# Patient Record
Sex: MALE
[De-identification: names, ages, dates, MRNs, and addresses within clinical notes are randomized per-mention and may not be internally consistent; named-entity substitution may affect disease eponyms.]

---

## 2018-05-10 ENCOUNTER — RX ONLY (RX ONLY)
Age: 58
End: 2018-05-10

## 2018-05-10 ENCOUNTER — DOCTOR'S OFFICE (OUTPATIENT)
Dept: URBAN - METROPOLITAN AREA CLINIC 126 | Facility: CLINIC | Age: 58
Setting detail: OPHTHALMOLOGY
End: 2018-05-10
Payer: COMMERCIAL

## 2018-05-10 DIAGNOSIS — H40.1132: ICD-10-CM

## 2018-05-10 DIAGNOSIS — H01.004: ICD-10-CM

## 2018-05-10 DIAGNOSIS — H01.001: ICD-10-CM

## 2018-05-10 DIAGNOSIS — E11.9: ICD-10-CM

## 2018-05-10 DIAGNOSIS — H25.13: ICD-10-CM

## 2018-05-10 DIAGNOSIS — H04.123: ICD-10-CM

## 2018-05-10 PROCEDURE — 92083 EXTENDED VISUAL FIELD XM: CPT | Performed by: OPHTHALMOLOGY

## 2018-05-10 PROCEDURE — 65855 TRABECULOPLASTY LASER SURG: CPT | Performed by: OPHTHALMOLOGY

## 2018-05-10 PROCEDURE — 92133 CPTRZD OPH DX IMG PST SGM ON: CPT | Performed by: OPHTHALMOLOGY

## 2018-05-10 PROCEDURE — 92004 COMPRE OPH EXAM NEW PT 1/>: CPT | Performed by: OPHTHALMOLOGY

## 2018-05-10 ASSESSMENT — REFRACTION_AUTOREFRACTION
OD_AXIS: 006
OS_AXIS: 009
OS_CYLINDER: +1.00
OD_CYLINDER: +0.75
OD_SPHERE: PLANO
OS_SPHERE: PLANO

## 2018-05-10 ASSESSMENT — REFRACTION_OUTSIDERX
OD_VA2: 20/
OD_VA1: 20/
OD_VA3: 20/
OS_SPHERE: +2.50
OS_VA1: 20/
OD_SPHERE: +2.50
OU_VA: 20/
OS_VA2: 20/
OS_VA3: 20/

## 2018-05-10 ASSESSMENT — REFRACTION_MANIFEST
OS_VA3: 20/
OD_VA2: 20/
OS_ADD: +2.50
OU_VA: 20/
OS_VA2: 20/
OD_VA2: 20/
OS_AXIS: 180
OU_VA: 20/
OD_VA1: 20/
OD_VA3: 20/
OS_VA1: 20/
OS_CYLINDER: +0.50
OD_SPHERE: -0.25
OD_ADD: +2.50
OS_VA2: 20/
OS_SPHERE: -0.25
OD_VA3: 20/
OS_VA3: 20/
OS_VA1: 20/25
OD_VA1: 20/25

## 2018-05-10 ASSESSMENT — REFRACTION_CURRENTRX
OD_OVR_VA: 20/
OS_OVR_VA: 20/
OS_OVR_VA: 20/
OD_OVR_VA: 20/
OD_OVR_VA: 20/
OS_OVR_VA: 20/

## 2018-05-10 ASSESSMENT — TEAR BREAK UP TIME (TBUT)
OS_TBUT: 1+
OD_TBUT: 1+

## 2018-05-10 ASSESSMENT — LID EXAM ASSESSMENTS
OD_BLEPHARITIS: 1+
OS_BLEPHARITIS: 1+

## 2018-05-10 ASSESSMENT — VISUAL ACUITY
OS_BCVA: 20/30+2
OD_BCVA: 20/30-1

## 2018-05-10 ASSESSMENT — KERATOMETRY
OS_K1POWER_DIOPTERS: 42.25
OS_AXISANGLE_DEGREES: 54
OD_K2POWER_DIOPTERS: 42.50
OD_K1POWER_DIOPTERS: 41.75
OD_AXISANGLE_DEGREES: 69
OS_K2POWER_DIOPTERS: 43.00

## 2018-05-10 ASSESSMENT — SPHEQUIV_DERIVED: OS_SPHEQUIV: 0

## 2018-05-10 ASSESSMENT — CONFRONTATIONAL VISUAL FIELD TEST (CVF)
OD_FINDINGS: FULL
OS_FINDINGS: FULL

## 2018-05-10 ASSESSMENT — AXIALLENGTH_DERIVED: OS_AL: 23.9177

## 2018-05-10 ASSESSMENT — SUPERFICIAL PUNCTATE KERATITIS (SPK)
OS_SPK: 1+
OD_SPK: 1+

## 2018-05-25 ENCOUNTER — OFFICE VISIT (OUTPATIENT)
Dept: INTERNAL MEDICINE | Facility: HOSPITAL | Age: 58
End: 2018-05-25
Attending: STUDENT IN AN ORGANIZED HEALTH CARE EDUCATION/TRAINING PROGRAM
Payer: COMMERCIAL

## 2018-05-25 VITALS
WEIGHT: 235 LBS | TEMPERATURE: 96.4 F | HEIGHT: 74 IN | OXYGEN SATURATION: 99 % | HEART RATE: 76 BPM | RESPIRATION RATE: 18 BRPM | BODY MASS INDEX: 30.16 KG/M2 | SYSTOLIC BLOOD PRESSURE: 145 MMHG | DIASTOLIC BLOOD PRESSURE: 74 MMHG

## 2018-05-25 DIAGNOSIS — F17.200 SMOKING: ICD-10-CM

## 2018-05-25 DIAGNOSIS — E11.40 TYPE 2 DIABETES MELLITUS WITH DIABETIC NEUROPATHY, WITHOUT LONG-TERM CURRENT USE OF INSULIN (CMS/HCC): ICD-10-CM

## 2018-05-25 DIAGNOSIS — E66.9 OBESITY (BMI 30-39.9): ICD-10-CM

## 2018-05-25 DIAGNOSIS — G89.29 OTHER CHRONIC PAIN: Primary | ICD-10-CM

## 2018-05-25 PROBLEM — E11.49 TYPE 2 DIABETES MELLITUS WITH NEUROLOGIC COMPLICATION (CMS/HCC): Status: ACTIVE | Noted: 2018-05-25

## 2018-05-25 PROCEDURE — 99243 OFF/OP CNSLTJ NEW/EST LOW 30: CPT | Performed by: HOSPITALIST

## 2018-05-25 RX ORDER — PIOGLITAZONEHYDROCHLORIDE 30 MG/1
30 TABLET ORAL DAILY
Qty: 90 TABLET | Refills: 1
Start: 2018-05-25 | End: 2018-11-21

## 2018-05-25 RX ORDER — METFORMIN HYDROCHLORIDE 750 MG/1
750 TABLET, EXTENDED RELEASE ORAL 2 TIMES DAILY WITH MEALS
Qty: 180 TABLET | Refills: 1
Start: 2018-05-25 | End: 2021-09-24 | Stop reason: HOSPADM

## 2018-05-25 RX ORDER — TIZANIDINE 4 MG/1
4 TABLET ORAL EVERY 6 HOURS PRN
Refills: 0
Start: 2018-05-25 | End: 2018-06-08

## 2018-05-25 RX ORDER — LISINOPRIL AND HYDROCHLOROTHIAZIDE 12.5; 2 MG/1; MG/1
1 TABLET ORAL DAILY
Qty: 90 TABLET | Refills: 3
Start: 2018-05-25 | End: 2019-05-25

## 2018-05-25 RX ORDER — ALPRAZOLAM 1 MG/1
1 TABLET ORAL 2 TIMES DAILY PRN
Refills: 0 | COMMUNITY
Start: 2018-05-16 | End: 2021-09-22 | Stop reason: ALTCHOICE

## 2018-05-25 ASSESSMENT — ENCOUNTER SYMPTOMS
FATIGUE: 0
PALPITATIONS: 0
DIZZINESS: 0
CONFUSION: 0
GASTROINTESTINAL NEGATIVE: 1
CHILLS: 0
SEIZURES: 0
POLYDIPSIA: 0
SHORTNESS OF BREATH: 1
EYES NEGATIVE: 1
AGITATION: 0
FEVER: 0
BACK PAIN: 1
COUGH: 0
HEMATOLOGIC/LYMPHATIC NEGATIVE: 1
ALLERGIC/IMMUNOLOGIC NEGATIVE: 1
WHEEZING: 0
CARDIOVASCULAR NEGATIVE: 1
POLYPHAGIA: 0

## 2018-05-25 NOTE — ASSESSMENT & PLAN NOTE
Discussed diet modification and exercise. As above pt not interested in exercise without prescription for opiate pain medication.

## 2018-05-25 NOTE — ASSESSMENT & PLAN NOTE
Last hemoglobin A1c 6.5% in January 2018,  Pt currently on Actos and metformin  - as pt has gained 30 lbs since last A1c, would like to repeat now, however pt has declined

## 2018-05-25 NOTE — PROGRESS NOTES
"       [facility]  History & Physical        CHIEF COMPLAINT   No chief complaint on file.  Establish care       HISTORY OF PRESENT ILLNESS      This is a 58 y.o. male with a past medical history of Dm2 not on insulin, chronic back pain (with chronic opiate use), current 1ppd smoker with 40 pack-year history, who presents to [facility] establish care.    Upon my entering the room pt stating \"i'm not doing well\" stating his lower back pain is acting up. He reports he has gained 30 pounds since the holidays 6 months ago. His PCP had been prescribing him oxycodone for pain as well as alprazolam for anxiety however our practice is closer to his home and he is interested in switching to us.     He reports he is out of oxycodone and needs refills. Right now he is taking gabapentin and naproxen which he does not find sufficient. He reports he has never been to pain management, and didn't need to since his former PCP was prescribing him his pain medicines. He reports going to physical therapy on occasion but states he cannot go now unless he gets oxycodone.     Pt also long-standing hx Dm2, had previously been on insulin, but reports   last hemoglobin A1c 6.5% 1/2018    Checks blood glucose in am before breakfast and again before dinner, states BG run in  130-140 mg/dL range. He takes metformin and Actos and has been off insulin for many months. Denies polyuria/polydipsia.    PAST MEDICAL AND SURGICAL HISTORY      PMHx:  Past Medical History:   Diagnosis Date   • Hypertension    • Type 2 diabetes mellitus (CMS/HCC) (HCC)        PSHx:  No past surgical history on file.  No hx surgery per pt    MEDICATIONS        Current Outpatient Prescriptions:   •  ALPRAZolam (XANAX) 1 mg tablet, Take 1 mg by mouth 2 (two) times a day as needed., Disp: , Rfl: 0  •  lisinopril-hydrochlorothiazide (PRINZIDE,ZESTORETIC) 20-12.5 mg per tablet, Take 1 tablet by mouth daily., Disp: 90 tablet, Rfl: 3  •  metFORMIN XR (GLUCOPHAGE XR) 750 " "mg 24 hr tablet, Take 1 tablet (750 mg total) by mouth 2 (two) times a day with meals., Disp: 180 tablet, Rfl: 1  •  pioglitazone (ACTOS) 30 mg tablet, Take 1 tablet (30 mg total) by mouth daily., Disp: 90 tablet, Rfl: 1  •  tiZANidine (ZANAFLEX) 4 mg tablet, Take 1 tablet (4 mg total) by mouth every 6 (six) hours as needed for muscle spasms for up to 14 days., Disp: , Rfl: 0    ALLERGIES      No known allergies    FAMILY HISTORY      Family History   Problem Relation Age of Onset   • Diabetes Mother    • Diabetes Maternal Grandmother        SOCIAL HISTORY      Social History     Social History   • Marital status: Single     Spouse name: N/A   • Number of children: N/A   • Years of education: N/A     Social History Main Topics   • Smoking status: Current Every Day Smoker   • Smokeless tobacco: Never Used   • Alcohol use No   • Drug use: No   • Sexual activity: Not Asked     Other Topics Concern   • None     Social History Narrative   • None       REVIEW OF SYSTEMS      Review of Systems   Constitutional: Negative for chills, fatigue and fever.   HENT: Negative.    Eyes: Negative.    Respiratory: Positive for shortness of breath. Negative for cough and wheezing.    Cardiovascular: Negative.  Negative for chest pain and palpitations.   Gastrointestinal: Negative.    Endocrine: Negative for polydipsia and polyphagia.   Genitourinary: Negative.    Musculoskeletal: Positive for back pain.   Skin: Negative.    Allergic/Immunologic: Negative.    Neurological: Negative for dizziness and seizures.   Hematological: Negative.    Psychiatric/Behavioral: Negative for agitation and confusion.       PHYSICAL EXAMINATION      BP (!) 145/74   Pulse 76   Temp (!) 35.8 °C (96.4 °F)   Resp 18   Ht 1.88 m (6' 2\")   Wt 107 kg (235 lb)   SpO2 99%   BMI 30.17 kg/m²   Body mass index is 30.17 kg/m².    Physical Exam   Constitutional: He is oriented to person, place, and time. He appears well-developed and well-nourished. No distress. "   HENT:   Head: Normocephalic and atraumatic.   Eyes: Conjunctivae and EOM are normal.   Neck: Normal range of motion.   Pulmonary/Chest: Effort normal.   Musculoskeletal: He exhibits no edema.   Neurological: He is alert and oriented to person, place, and time.   Skin: Skin is warm and dry. He is not diaphoretic.   Psychiatric: He has a normal mood and affect. His behavior is normal.       LABS / IMAGING / STUDIES        Labs  No new labs.      Imaging  Not applicable    SCREENING         Pt deferred all screenings - see below       ASSESSMENT AND PLAN   Problem List     Chronic pain - Primary    Overview     05/16/2018 2 05/03/2018 ALPRAZOLAM 1 MG TABLET 120.0 60 JU PRECIOUS [id number] WELL[facility] (1360) 1 Private Pay [place]   05/03/2018 2 05/03/2018 ALPRAZOLAM 1 MG TABLET 60.0 30 JU PRECIOUS [id number] [facility]LL[facility] (1360) 0 Comm Ins [place]   04/09/2018 2 04/09/2018 ALPRAZOLAM 1 MG TABLET 60.0 30 JU PRECIOUS [id number] [facility]AROO[facility] (3055) 0 Comm Ins [place]   03/15/2018 2 03/15/2018 ALPRAZOLAM 0.5 MG TABLET 60.0 30 JU PRECIOUS [id number] [facility]AR[facility] (3055) 0 Comm Ins [place]   01/09/2018 1 01/06/2018 TRAMADOL HCL 50 MG TABLET 28.0 7 TO AAR [id number] CONTR (9778) 0 20.0 MME Comm Ins [place]   12/06/2017 2 12/06/2017 OXYCODONE HCL 20 MG TABLET 84.0 21 FR MOLLY [id number] WELL[facility] (1360) 0 120.0 MME Comm Ins [place]   11/15/2017 2 11/15/2017 OXYCODONE HCL 15 MG TABLET 84.0 21 FR MOLLY [id number] [facility] (1360) 0 90.0 MME Comm Ins [place]   10/16/2017 2 10/16/2017 OXYCODONE HCL 30 MG TABLET 112.0 28 FR MOLLY [id number] [facility] (1360) 0 180.0 MME Comm Ins [place]   09/16/2017 2 09/16/2017 OXYCODONE HCL 30 MG TABLET 112.0 28 FR MOLLY [id number] [facility] (1360) 0 180.0 MME Comm Ins [place]   08/26/2017 2 08/26/2017 OXYCODONE HCL 30 MG TABLET 84.0 21 ME KRISTIE [id number] [facility] (1360) 0 180.0 MME Comm Ins [place]   07/28/2017 2 07/28/2017 OXYCODONE HCL 30 MG TABLET 112.0 28 AM LAZ [id number] [facility] (1360) 0 180.0 MME Comm Ins [place]   06/30/2017 2 06/30/2017 OXYCODONE HCL 30 MG TABLET 112.0 28 DE COR [id number] [facility] (1360) 0 180.0 MME Comm Ins [place]   06/10/2017 2 06/10/2017  OXYCODONE HCL 30 MG TABLET 84.0 21 DE COR [id number] [facility] (9828) 0 180.0 MME Comm Ins [place]   06/02/2017 2 06/02/2017 OXYCODONE HCL 30 MG TABLET 32.0 8 DE COR [id number] [facility] [place] (6475) 0 180.0 MME Comm Ins [place]           Current Assessment & Plan     Had long discussion with pt regarding his chronic pain and use of opiate pain medications. I told him we do not, as a rule, prescribe opiate pain medications in this office.   - I offered to refer him pain management, and give him a short-term supply of tramadol as a bridge, however he stated that tramadol does not work for him and that if we will not prescribe him oxycodone, then he will go back to his prior PCP.   - Offered physical therapy referral, pt declined          Obesity (BMI 30-39.9)    Current Assessment & Plan     Discussed diet modification and exercise. As above pt not interested in exercise without prescription for opiate pain medication.            Type 2 diabetes mellitus with neurologic complication (CMS/HCC) (HCC)    Current Assessment & Plan     Last hemoglobin A1c 6.5% in January 2018,  Pt currently on Actos and metformin  - as pt has gained 30 lbs since last A1c, would like to repeat now, however pt has declined          Relevant Medications    metFORMIN XR (GLUCOPHAGE XR) 750 mg 24 hr tablet    pioglitazone (ACTOS) 30 mg tablet    Smoking    Current Assessment & Plan     40 pack year smoking  Current 1ppd smoking  Offered nicotine patch, medication, pt declined stating he would prefer to cut down on his own                  Marilee Freitas MD  05/25/18  10:15 AM

## 2018-05-25 NOTE — PROGRESS NOTES
[facility]  Internal Medicine Progress Note       SUBJECTIVE   Enrike Napoles is a 58 y.o. year-old male with a past medical history of *** who presents to [facility] to establish care.      Interval History: ***     REVIEW OF SYSTEMS   Review of Systems     MEDICATIONS      No current outpatient prescriptions on file.    PHYSICAL EXAMINATION   Vital signs: There were no vitals taken for this visit.  Physical Exam     LABS / IMAGING/STUDIES      Labs Reviewed: ***    Studies/Imaging Reviewed: ***        ASSESSMENT AND PLAN      Problem List     None           Marilee Freitas MD  05/25/18  8:44 AM

## 2018-05-25 NOTE — ASSESSMENT & PLAN NOTE
Had long discussion with pt regarding his chronic pain and use of opiate pain medications. I told him we do not, as a rule, prescribe opiate pain medications in this office.   - I offered to refer him pain management, and give him a short-term supply of tramadol as a bridge, however he stated that tramadol does not work for him and that if we will not prescribe him oxycodone, then he will go back to his prior PCP.   - Offered physical therapy referral, pt declined

## 2018-05-25 NOTE — PROGRESS NOTES
I have discussed the patient's care with the Resident. I have reviewed the patient's history and Resident's findings on exam, the patient's diagnosis/differential diagnosis and treatment plan. I concur with the treatment plan as documented by the Resident, except for my comments below or within additional notes today.    58m patient came in today with complaint of chronic lower back pain.  From beginning of examination patient asked Dr. Freitas to prescribe oxycodone.  No red flags in back pain.  Gaining weight.  Dr. Freitas offered a number of  Non-opioid measures to control pain, but patient consistently demanded oxycodone and walked out of the office before his visit was completed to Dr. Freitas's standards stating he was only here to get oxycodone.

## 2018-05-25 NOTE — ASSESSMENT & PLAN NOTE
40 pack year smoking  Current 1ppd smoking  Offered nicotine patch, medication, pt declined stating he would prefer to cut down on his own

## 2018-08-28 ENCOUNTER — TELEPHONE (OUTPATIENT)
Dept: INTERNAL MEDICINE | Facility: HOSPITAL | Age: 58
End: 2018-08-28

## 2018-08-28 NOTE — TELEPHONE ENCOUNTER
Green Team [facility] 8/28/2018    Please contact patient to clarify who PCP is. If [facility] please Health Maintenance appt.     Dx: DMII, chronic pain   UTD:  Needs:  Hepatitis C Screening   Dilated Retinal Exam   Diabetic Foot Exam   Hemoglobin A1C   DTaP, Tdap, and Td Vaccines   Urine Protein Screening   Colorectal Cancer Screening

## 2018-11-19 ENCOUNTER — TELEPHONE (OUTPATIENT)
Dept: INTENSIVE CARE | Facility: HOSPITAL | Age: 58
End: 2018-11-19

## 2018-11-19 NOTE — TELEPHONE ENCOUNTER
I reached son, Dougie Napoles, and he gave me an additional phone number for his father. I called that number and left a message to call and schedule an appointment if he would like to do so. I have left both numbers on the chart as they are both current according to his son.

## 2019-01-07 ENCOUNTER — TELEPHONE (OUTPATIENT)
Dept: INTERNAL MEDICINE | Facility: HOSPITAL | Age: 59
End: 2019-01-07

## 2019-01-07 NOTE — TELEPHONE ENCOUNTER
[facility] Review:    As per note from 8/28/2018, pt due for:    Hepatitis C Screening   Dilated Retinal Exam   Diabetic Foot Exam   Hemoglobin A1C   DTaP, Tdap, and Td Vaccines   Urine Protein Screening   Colorectal Cancer Screening     Attempts have been made to contact patient to schedule an appointment, last 11/19/2019.     Please try again to clarify if pt still wishes to follow with [facility] as PCP and if so, please schedule appointment.    Akua Gates MD  Internal Medicine PGY-1  [id number]

## 2019-04-29 ENCOUNTER — TELEPHONE (OUTPATIENT)
Dept: INTERNAL MEDICINE | Facility: HOSPITAL | Age: 59
End: 2019-04-29

## 2019-05-30 ENCOUNTER — TELEPHONE (OUTPATIENT)
Dept: INTERNAL MEDICINE | Facility: HOSPITAL | Age: 59
End: 2019-05-30

## 2019-05-30 NOTE — TELEPHONE ENCOUNTER
Chart Review  - Insurance is still  inactive  - Last office visit 5-25-18  - No ED visits within one year    Care Gaps;  - Hep C screening  - Pneumococcal  - Annual Retinal exam  - Diabetic Foot Exam  - HgbA1c  - Urine protein screening'  -TDAP  -Shingrix  -Colonoscopy

## 2019-07-10 ENCOUNTER — TELEPHONE (OUTPATIENT)
Dept: INTERNAL MEDICINE | Facility: HOSPITAL | Age: 59
End: 2019-07-10

## 2021-09-21 ENCOUNTER — HOSPITAL ENCOUNTER (INPATIENT)
Facility: HOSPITAL | Age: 61
LOS: 2 days | Discharge: HOME | End: 2021-09-24
Attending: EMERGENCY MEDICINE | Admitting: HOSPITALIST
Payer: COMMERCIAL

## 2021-09-21 ENCOUNTER — APPOINTMENT (EMERGENCY)
Dept: RADIOLOGY | Facility: HOSPITAL | Age: 61
End: 2021-09-21
Payer: COMMERCIAL

## 2021-09-21 ENCOUNTER — APPOINTMENT (EMERGENCY)
Dept: RADIOLOGY | Facility: HOSPITAL | Age: 61
End: 2021-09-21
Attending: EMERGENCY MEDICINE
Payer: COMMERCIAL

## 2021-09-21 DIAGNOSIS — R79.89 ELEVATED TROPONIN: Primary | ICD-10-CM

## 2021-09-21 DIAGNOSIS — D64.9 ANEMIA, UNSPECIFIED TYPE: ICD-10-CM

## 2021-09-21 DIAGNOSIS — Z86.711 HISTORY OF PULMONARY EMBOLUS (PE): ICD-10-CM

## 2021-09-21 DIAGNOSIS — I50.9 CONGESTIVE HEART FAILURE, UNSPECIFIED HF CHRONICITY, UNSPECIFIED HEART FAILURE TYPE (CMS/HCC): ICD-10-CM

## 2021-09-21 DIAGNOSIS — I50.20 SYSTOLIC CONGESTIVE HEART FAILURE, UNSPECIFIED HF CHRONICITY (CMS/HCC): ICD-10-CM

## 2021-09-21 LAB
ALBUMIN SERPL-MCNC: 3.2 G/DL (ref 3.4–5)
ALP SERPL-CCNC: 78 IU/L (ref 35–126)
ALT SERPL-CCNC: 32 IU/L (ref 16–63)
ANION GAP SERPL CALC-SCNC: 10 MEQ/L (ref 3–15)
ANISOCYTOSIS BLD QL SMEAR: ABNORMAL
APTT PPP: 31 SEC (ref 23–35)
AST SERPL-CCNC: 28 IU/L (ref 15–41)
BASOPHILS # BLD: 0.05 K/UL (ref 0.01–0.1)
BASOPHILS NFR BLD: 0.7 %
BILIRUB DIRECT SERPL-MCNC: 0.1 MG/DL
BILIRUB SERPL-MCNC: 0.5 MG/DL (ref 0.3–1.2)
BNP SERPL-MCNC: 1476 PG/ML
BUN SERPL-MCNC: 15 MG/DL (ref 8–20)
CALCIUM SERPL-MCNC: 9.2 MG/DL (ref 8.9–10.3)
CHLORIDE SERPL-SCNC: 109 MEQ/L (ref 98–109)
CO2 SERPL-SCNC: 20 MEQ/L (ref 22–32)
CREAT SERPL-MCNC: 1.2 MG/DL (ref 0.8–1.3)
DIFFERENTIAL METHOD BLD: ABNORMAL
EOSINOPHIL # BLD: 0.15 K/UL (ref 0.04–0.54)
EOSINOPHIL NFR BLD: 2.2 %
ERYTHROCYTE [DISTWIDTH] IN BLOOD BY AUTOMATED COUNT: 24.1 % (ref 11.6–14.4)
GFR SERPL CREATININE-BSD FRML MDRD: >60 ML/MIN/1.73M*2
GLUCOSE SERPL-MCNC: 140 MG/DL (ref 70–99)
HCT VFR BLDCO AUTO: 31.6 % (ref 40.1–51)
HGB BLD-MCNC: 8.9 G/DL (ref 13.7–17.5)
HYPOCHROMIA BLD QL SMEAR: ABNORMAL
IMM GRANULOCYTES # BLD AUTO: 0.02 K/UL (ref 0–0.08)
IMM GRANULOCYTES NFR BLD AUTO: 0.3 %
INR PPP: 1.1
LYMPHOCYTES # BLD: 1.39 K/UL (ref 1.2–3.5)
LYMPHOCYTES NFR BLD: 20.4 %
MCH RBC QN AUTO: 21.5 PG (ref 28–33.2)
MCHC RBC AUTO-ENTMCNC: 28.2 G/DL (ref 32.2–36.5)
MCV RBC AUTO: 76.3 FL (ref 83–98)
MICROCYTES BLD QL SMEAR: ABNORMAL
MONOCYTES # BLD: 0.59 K/UL (ref 0.3–1)
MONOCYTES NFR BLD: 8.7 %
NEUTROPHILS # BLD: 4.62 K/UL (ref 1.7–7)
NEUTS SEG NFR BLD: 67.7 %
NRBC BLD-RTO: 0 %
PDW BLD AUTO: 10.1 FL (ref 9.4–12.4)
PLAT MORPH BLD: NORMAL
PLATELET # BLD AUTO: 276 K/UL (ref 150–350)
PLATELET # BLD EST: ABNORMAL 10*3/UL
POLYCHROMASIA BLD QL SMEAR: ABNORMAL
POTASSIUM SERPL-SCNC: 4 MEQ/L (ref 3.6–5.1)
PROT SERPL-MCNC: 6.7 G/DL (ref 6–8.2)
PROTHROMBIN TIME: 14 SEC (ref 12.2–14.5)
RBC # BLD AUTO: 4.14 M/UL (ref 4.5–5.8)
SARS-COV-2 RNA RESP QL NAA+PROBE: NEGATIVE
SCHISTOCYTES BLD QL SMEAR: ABNORMAL
SODIUM SERPL-SCNC: 139 MEQ/L (ref 136–144)
TARGETS BLD QL SMEAR: ABNORMAL
TROPONIN I SERPL-MCNC: 0.11 NG/ML
WBC # BLD AUTO: 6.82 K/UL (ref 3.8–10.5)

## 2021-09-21 PROCEDURE — 85730 THROMBOPLASTIN TIME PARTIAL: CPT | Performed by: PHYSICIAN ASSISTANT

## 2021-09-21 PROCEDURE — 71260 CT THORAX DX C+: CPT | Mod: ME

## 2021-09-21 PROCEDURE — 36415 COLL VENOUS BLD VENIPUNCTURE: CPT | Performed by: PHYSICIAN ASSISTANT

## 2021-09-21 PROCEDURE — 83880 ASSAY OF NATRIURETIC PEPTIDE: CPT | Performed by: PHYSICIAN ASSISTANT

## 2021-09-21 PROCEDURE — 80048 BASIC METABOLIC PNL TOTAL CA: CPT | Performed by: PHYSICIAN ASSISTANT

## 2021-09-21 PROCEDURE — 99285 EMERGENCY DEPT VISIT HI MDM: CPT | Mod: 25

## 2021-09-21 PROCEDURE — 84484 ASSAY OF TROPONIN QUANT: CPT | Performed by: PHYSICIAN ASSISTANT

## 2021-09-21 PROCEDURE — 71045 X-RAY EXAM CHEST 1 VIEW: CPT

## 2021-09-21 PROCEDURE — 87631 RESP VIRUS 3-5 TARGETS: CPT | Performed by: STUDENT IN AN ORGANIZED HEALTH CARE EDUCATION/TRAINING PROGRAM

## 2021-09-21 PROCEDURE — 93005 ELECTROCARDIOGRAM TRACING: CPT | Performed by: PHYSICIAN ASSISTANT

## 2021-09-21 PROCEDURE — 85025 COMPLETE CBC W/AUTO DIFF WBC: CPT | Performed by: PHYSICIAN ASSISTANT

## 2021-09-21 PROCEDURE — 63700000 HC SELF-ADMINISTRABLE DRUG: Performed by: EMERGENCY MEDICINE

## 2021-09-21 PROCEDURE — 63600000 HC DRUGS/DETAIL CODE: Performed by: EMERGENCY MEDICINE

## 2021-09-21 PROCEDURE — 80076 HEPATIC FUNCTION PANEL: CPT | Performed by: PHYSICIAN ASSISTANT

## 2021-09-21 PROCEDURE — 85610 PROTHROMBIN TIME: CPT | Performed by: PHYSICIAN ASSISTANT

## 2021-09-21 PROCEDURE — U0003 INFECTIOUS AGENT DETECTION BY NUCLEIC ACID (DNA OR RNA); SEVERE ACUTE RESPIRATORY SYNDROME CORONAVIRUS 2 (SARS-COV-2) (CORONAVIRUS DISEASE [COVID-19]), AMPLIFIED PROBE TECHNIQUE, MAKING USE OF HIGH THROUGHPUT TECHNOLOGIES AS DESCRIBED BY CMS-2020-01-R: HCPCS | Performed by: PHYSICIAN ASSISTANT

## 2021-09-21 PROCEDURE — 63600000 HC DRUGS/DETAIL CODE: Performed by: PHYSICIAN ASSISTANT

## 2021-09-21 PROCEDURE — 96365 THER/PROPH/DIAG IV INF INIT: CPT | Mod: 59

## 2021-09-21 PROCEDURE — 96374 THER/PROPH/DIAG INJ IV PUSH: CPT | Mod: 59

## 2021-09-21 PROCEDURE — 96375 TX/PRO/DX INJ NEW DRUG ADDON: CPT | Mod: 59

## 2021-09-21 PROCEDURE — 63600105 HC IODINE BASED CONTRAST: Performed by: EMERGENCY MEDICINE

## 2021-09-21 RX ORDER — INSULIN GLARGINE 100 [IU]/ML
40 INJECTION, SOLUTION SUBCUTANEOUS
COMMUNITY
Start: 2021-09-17 | End: 2021-09-22 | Stop reason: ALTCHOICE

## 2021-09-21 RX ORDER — FUROSEMIDE 10 MG/ML
40 INJECTION INTRAMUSCULAR; INTRAVENOUS ONCE
Status: COMPLETED | OUTPATIENT
Start: 2021-09-21 | End: 2021-09-21

## 2021-09-21 RX ORDER — OMEPRAZOLE 20 MG/1
20 CAPSULE, DELAYED RELEASE ORAL
COMMUNITY
Start: 2021-09-17 | End: 2021-09-22

## 2021-09-21 RX ORDER — HEPARIN SODIUM 10000 [USP'U]/100ML
100-4000 INJECTION, SOLUTION INTRAVENOUS
Status: DISCONTINUED | OUTPATIENT
Start: 2021-09-21 | End: 2021-09-23

## 2021-09-21 RX ORDER — METFORMIN HYDROCHLORIDE 1000 MG/1
1000 TABLET, FILM COATED, EXTENDED RELEASE ORAL DAILY
COMMUNITY
Start: 2021-09-17 | End: 2021-09-22 | Stop reason: ALTCHOICE

## 2021-09-21 RX ORDER — GABAPENTIN 300 MG/1
300 CAPSULE ORAL 3 TIMES DAILY
COMMUNITY
Start: 2021-09-17 | End: 2021-09-22

## 2021-09-21 RX ORDER — NAPROXEN 250 MG/1
250 TABLET ORAL
COMMUNITY
Start: 2021-09-17 | End: 2021-09-22

## 2021-09-21 RX ADMIN — NITROGLYCERIN 1 INCH: 20 OINTMENT TOPICAL at 22:45

## 2021-09-21 RX ADMIN — FUROSEMIDE 40 MG: 10 INJECTION, SOLUTION INTRAMUSCULAR; INTRAVENOUS at 22:45

## 2021-09-21 RX ADMIN — HEPARIN SODIUM 1650 UNITS/HR: 10000 INJECTION, SOLUTION INTRAVENOUS at 20:03

## 2021-09-21 RX ADMIN — IOHEXOL 80 ML: 350 INJECTION, SOLUTION INTRAVENOUS at 22:13

## 2021-09-21 ASSESSMENT — ENCOUNTER SYMPTOMS
VOMITING: 0
ALLERGIC/IMMUNOLOGIC NEGATIVE: 1
APPETITE CHANGE: 0
NAUSEA: 0
SORE THROAT: 0
BACK PAIN: 0
ABDOMINAL PAIN: 0
LIGHT-HEADEDNESS: 0
FEVER: 0
HEADACHES: 0
SHORTNESS OF BREATH: 1
DIARRHEA: 1
COUGH: 1

## 2021-09-21 NOTE — ED ATTESTATION NOTE
I have personally seen and examined the patient.   EHR/RN and PA hx reviewed    I reviewed and agree with the PA/NP's assessment and plan of care. Defer to my note for any discrepancies b/t us.     My examination, assessment, and plan of care of Enrike Napoles is as follows:    HPI-  Patient is recently hospitalized at [facility].  Diagnosed with pulmonary embolism and DVT.  Patient also had an occult GI bleed and anemia.  An IVC filter was placed.  Patient was going to get endoscopy to evaluate for GI source of bleeding but he left AGAINST MEDICAL ADVICE.    Patient reports his shortness of breath and chest pain got worse since leaving and he is here for that reason.      [facility] records    # Acute pulmonary embolism - left upper lobe - concern for chronic thromboembolism RLL  # Acute RLE DVT  With elevated troponin to 0.545 and NT proBNP elevated to 3815. No evidence of right heart strain on TTE. -160s systolic, without tachycardia and without hypoxia. CT chest showed small acute PE in left upper lobe branch. PERT team aware and no urgent intervention needed. Unsure if chronic thromboembolism RLL. DVT study shows acute deep vein thrombus noted in the right posterior tibial, peroneal, popliteal, and distal thigh femoral vein. TTE shows LVEF 25-30%, no evidence of right heart strain. Heme/onc was consulted for AC plan given c/f acute blood loss. He was continued on heparin drip with plans to switch to apixaban after his procedures however he eloped and did not wait for us to fill any meds. Patient hesitant to make decisions without his sister Georgina's involvement. They decided to proceed with IVC filter. IR able to place IVC filter 9/16. GI unable to complete EGD/COLO on 9/17 because patient decided to elope last minute. GI made aware.         PE-  G- alert  p- mild dec'd BS b/l bases  cv- rrr, no r/m/g  A- s, nt/nd, no g/r  Ext-right leg greater than leg leg by several centimeters.  Right calf has mild erythema.  Rectal-  pt refused rectal    Data reviewed    No PE seen on CAT scan today.  Patient symptoms and abnormal biomarkers likely due to ADHF.  Doubt PE  Admitted for further management.      I was physically present for the key/critical portions of the following procedures: None           Juan José Roper MD  09/22/21 [id number]

## 2021-09-21 NOTE — ED PROVIDER NOTES
Emergency Medicine Note  HPI   HISTORY OF PRESENT ILLNESS     61-year-old male PMH prostate cancer s/p prostatectomy, IDDM, HTN presents emergency department for evaluation of shortness of breath.  Patient was recently discharged/left AMA from [facility] on 9/17/2021 after being diagnosed with DVT (RLE) and pulmonary embolism (MICHELLE). IVC filter was placed LVEF 25-30% without evidence of right heart strain. Patient was found to be anemic with Hgb of 6.4 and received 3 units pRBCs.  Patient reports he had things he had to deal with outside of the hospital and was discharged home without any medications.  Patient reports since he was discharged she has been feeling significantly more short of breath.  He reports chest pain but only with lying flat at night.  Additionally he reports worsening cough that is worse when he lies flat.      History provided by:  Patient and medical records   used: No    Shortness of Breath  Associated symptoms: chest pain and cough    Associated symptoms: no abdominal pain, no fever, no headaches, no rash, no sore throat and no vomiting          Patient History   PAST HISTORY     Reviewed from Nursing Triage:      Past Medical History:   Diagnosis Date   • Hypertension    • Type 2 diabetes mellitus (CMS/HCC)        Past Surgical History:   Procedure Laterality Date   • PROSTATE SURGERY         Family History   Problem Relation Age of Onset   • Diabetes Biological Mother    • Diabetes Maternal Grandmother        Social History     Tobacco Use   • Smoking status: Current Some Day Smoker     Types: Cigarettes   • Smokeless tobacco: Never Used   Substance Use Topics   • Alcohol use: No   • Drug use: No         Review of Systems   REVIEW OF SYSTEMS     Review of Systems   Constitutional: Negative for appetite change and fever.   HENT: Negative for sore throat.    Eyes: Negative for visual disturbance.   Respiratory: Positive for cough and shortness of breath.     Cardiovascular: Positive for chest pain. Negative for leg swelling.   Gastrointestinal: Positive for diarrhea. Negative for abdominal pain, nausea and vomiting.   Genitourinary:        Urinary incontinence   Musculoskeletal: Negative for back pain.   Skin: Negative for rash.   Allergic/Immunologic: Negative.    Neurological: Negative for light-headedness and headaches.         VITALS     ED Vitals    Date/Time Temp Pulse Resp BP SpO2 [facility]   21 0200 -- 80 19 168/81 98 % EE   21 0100 -- 82 23 169/94 99 % EE   21 0030 -- 82 28 98/81 95 % [facility]   21 2330 -- 83 23 161/107 99 % EE   21 2245 -- 80 -- 162/93 -- EE   21 2230 -- 80 24 162/93 99 % EE   21 2200 -- 80 24 169/93 99 % EE   21 2100 -- 81 28 163/88 100 % EE   21 2000 -- 83 24 170/83 100 % EE   21 1841 36.8 °C (98.3 °F) 85 29 156/89 93 % JL        Pulse Ox %: 95 % (21)  Pulse Ox Interpretation: Normal (21)  Heart Rate: 85 (21)  Rhythm Strip Interpretation: Normal Sinus Rhythm (21)     Physical Exam   PHYSICAL EXAM     Physical Exam  Vitals and nursing note reviewed.   Constitutional:       Appearance: Normal appearance.   HENT:      Head: Normocephalic and atraumatic.   Eyes:      Pupils: Pupils are equal, round, and reactive to light.   Cardiovascular:      Rate and Rhythm: Normal rate.      Heart sounds: Normal heart sounds. No friction rub.   Pulmonary:      Effort: Pulmonary effort is normal.      Breath sounds: Examination of the right-lower field reveals rales. Examination of the left-lower field reveals rales. Rales present.   Abdominal:      General: There is no distension.      Palpations: Abdomen is soft.      Tenderness: There is no abdominal tenderness.   Musculoskeletal:         General: Normal range of motion.      Cervical back: Normal range of motion.      Right lower leg: No edema.      Left lower le+ Edema present.   Skin:     General: Skin is  warm.      Capillary Refill: Capillary refill takes less than 2 seconds.   Neurological:      Mental Status: He is alert and oriented to person, place, and time.   Psychiatric:         Mood and Affect: Mood normal.           PROCEDURES     Procedures     DATA     Results     Procedure Component Value Units Date/Time    PTT [[id number]]  (Abnormal) Collected: 09/22/21 0156    Specimen: Blood, Venous Updated: 09/22/21 0317     PTT 99 sec      Comment: The Standard Therapeutic Range for Heparin is 68 to 101 seconds.       [place] Draw Panel [[id number]] Collected: 09/21/21 1847    Specimen: Blood, Venous Updated: 09/22/21 0301    Narrative:      The following orders were created for panel order [place] Draw Panel.  Procedure                               Abnormality         Status                     ---------                               -----------         ------                     RAINBOW LT BLUE[[id number]]                                  Final result                 Please view results for these tests on the individual orders.    RAINBOW LT BLUE [[id number]] Collected: 09/21/21 1847    Specimen: Blood, Venous Updated: 09/22/21 0301    SARS-CoV-2 (COVID-19), PCR Nasopharynx [[id number]]  (Normal) Collected: 09/21/21 1955    Specimen: Nasopharyngeal Swab from Nasopharynx Updated: 09/21/21 2109    Narrative:      The following orders were created for panel order SARS-CoV-2 (COVID-19), PCR Nasopharynx.  Procedure                               Abnormality         Status                     ---------                               -----------         ------                     SARS-CoV-2 (COVID-19), P...[[id number]]  Normal              Final result                 Please view results for these tests on the individual orders.    SARS-CoV-2 (COVID-19), PCR Nasopharynx [[id number]]  (Normal) Collected: 09/21/21 1955    Specimen: Nasopharyngeal Swab from Nasopharynx Updated: 09/21/21 2109     SARS-CoV-2 (COVID-19) Negative     B-type natriuretic peptide [[id number]]  (Abnormal) Collected: 09/21/21 1847    Specimen: Blood, Venous Updated: 09/21/21 2020     BNP 1,476 pg/mL     Protime-INR [[id number]]  (Normal) Collected: 09/21/21 1847    Specimen: Blood, Venous Updated: 09/21/21 2019     PT 14.0 sec      INR 1.1     Comment: INR has no defined significance when PT is within Reference Range.       Narrative:      Draw PT/INR ASAP prior to starting heparin infusion.    PTT [[id number]]  (Normal) Collected: 09/21/21 1847    Specimen: Blood, Venous Updated: 09/21/21 2019     PTT 31 sec     Narrative:      Draw PT/INR ASAP prior to starting heparin infusion.    Hepatic function panel [[id number]]  (Abnormal) Collected: 09/21/21 1847    Specimen: Blood, Venous Updated: 09/21/21 2007     Albumin 3.2 g/dL      Bilirubin, Total 0.5 mg/dL      Bilirubin, Direct 0.1 mg/dL      Alkaline Phosphatase 78 IU/L      AST (SGOT) 28 IU/L      ALT (SGPT) 32 IU/L      Total Protein 6.7 g/dL      Comment: Test performed on plasma which typically contains approximately 0.4 g/dL more protein than serum.       CBC and differential [[id number]]  (Abnormal) Collected: 09/21/21 1847    Specimen: Blood, Venous Updated: 09/21/21 1934     WBC 6.82 K/uL      RBC 4.14 M/uL      Hemoglobin 8.9 g/dL      Hematocrit 31.6 %      MCV 76.3 fL      MCH 21.5 pg      MCHC 28.2 g/dL      RDW 24.1 %      Platelets 276 K/uL      MPV 10.1 fL      Differential Type Auto     nRBC 0.0 %      Immature Granulocytes 0.3 %      Neutrophils 67.7 %      Lymphocytes 20.4 %      Monocytes 8.7 %      Eosinophils 2.2 %      Basophils 0.7 %      Immature Granulocytes, Absolute 0.02 K/uL      Neutrophils, Absolute 4.62 K/uL      Lymphocytes, Absolute 1.39 K/uL      Monocytes, Absolute 0.59 K/uL      Eosinophils, Absolute 0.15 K/uL      Basophils, Absolute 0.05 K/uL      PLT Morphology Normal     Platelet Estimate Adequate (150,000-400,000)     Anisocytosis 2+     Hypochromia 1+     Microcytes 1+      Polychromasia 1+     Schistocytes Occasional     Target Cells Occasional    Basic metabolic panel [[id number]]  (Abnormal) Collected: 09/21/21 1847    Specimen: Blood, Venous Updated: 09/21/21 1921     Sodium 139 mEQ/L      Potassium 4.0 mEQ/L      Comment: Results obtained on plasma. Plasma Potassium values may be up to 0.4 mEQ/L less than serum values. The differences may be greater for patients with high platelet or white cell counts.        Chloride 109 mEQ/L      CO2 20 mEQ/L      BUN 15 mg/dL      Creatinine 1.2 mg/dL      Glucose 140 mg/dL      Calcium 9.2 mg/dL      eGFR >60.0 mL/min/1.73m*2      Anion Gap 10 mEQ/L     Troponin I [[id number]]  (Abnormal) Collected: 09/21/21 1848    Specimen: Blood, Venous Updated: 09/21/21 1919     Troponin I 0.11 ng/mL      Comment: Result requires test to be repeated on new specimen 4-6 hours after the original.             Imaging Results          CT CHEST PULMONARY EMBOLISM WITH IV CONTRAST (Preliminary result)  Result time 09/21/21 23:24:23    ED Interpretation    Preliminary Results:    No old studies are available for comparison. Small bilateral pleural effusions are observed, right greater than left. Heterogeneous aeration is noted, which may be due to mild air trapping. No consolidation or pneumothorax is present. Her graft. The heart is enlarged. Left ventricle is moderately dilated. No evidence of RV strain. The great vessels are normal in caliber. No filling defects are observed in the pulmonary arteries to suggest emboli.    A 3 cm hypodensity is observed in the lateral cortex of the left kidney most likely representing a cyst. The left adrenal gland is thickened. The visualized portions of the epigastrium are otherwise unremarkable for a early arterial phase study.    The osseous structures are unremarkable.    Interpreted by: Ariel Garnett MD, Sep 21, 2021 11:09 PM                                 X-RAY CHEST 1 VIEW (Final result)  Result time 09/21/21  19:24:37    Final result                 Impression:    IMPRESSION: No active disease in the chest.    COMMENT: The current portable study the chest was compared to that dated  5/24/2006    The lungs are well aerated and clear.  The cardiomediastinal silhouette is  normal in size and configuration.  The costophrenic sulci and bony thorax are  intact.             Narrative:    CLINICAL HISTORY: Shortness of breath.                                ECG 12 lead         Electrocardiogram, 12-lead    (Results Pending)       Scoring tools                                 ED Course & MDM   MDM / ED COURSE and CLINICAL IMPRESSIONS     MDM  Number of Diagnoses or Management Options  Diagnosis management comments: Mr. Napoles is a 61 y.o. male who presented with worsening SOB via EMS. On initial evaluation he was found resting, breathing comfortably and hemodynamically stable. Triage vital signs were reviewed and patient was found to be hypertensive and tachypneac, otherwise unremarkable. Available prior medical records were also reviewed and considered. I thought of differential diagnosis including CHF exacerbation, anemia, right sided heart strain in the setting of known PE, effusion as well as other serious diagnoses. I have reviewed patient's labs. Patient's troponin is acutely elevated to 0.11, BNP is elevated to 1476.  Hemoglobin is 8.9.  I reviewed patient's imaging which shows small bilateral pleural effusions.  Patient received 40 mg IV Lasix and was started on heparin infusion for known PE    Disposition: Admission           Amount and/or Complexity of Data Reviewed  Clinical lab tests: reviewed  Tests in the radiology section of CPT®: reviewed  Independent visualization of images, tracings, or specimens: yes        ED Course as of 09/22/21 0324   Tue Sep 21, 2021   1852 EKG-NSR, 86, normal axis, normal axis, no STEMI. [JF]   [id number] Paged residents [LP]      ED Course User Index  [JF] Juan José Roper MD  [LP]  Chiquis, PADMINI Antonio Lindsey Anne, PA C  09/22/21 0321

## 2021-09-22 PROBLEM — E11.9 TYPE 2 DIABETES MELLITUS, WITH LONG-TERM CURRENT USE OF INSULIN (CMS/HCC): Status: ACTIVE | Noted: 2018-05-25

## 2021-09-22 PROBLEM — Z86.711 HISTORY OF PULMONARY EMBOLISM: Status: ACTIVE | Noted: 2021-09-22

## 2021-09-22 PROBLEM — R79.89 ELEVATED TROPONIN: Status: ACTIVE | Noted: 2021-09-22

## 2021-09-22 PROBLEM — Z79.4 TYPE 2 DIABETES MELLITUS, WITH LONG-TERM CURRENT USE OF INSULIN (CMS/HCC): Status: ACTIVE | Noted: 2018-05-25

## 2021-09-22 PROBLEM — R06.02 SHORTNESS OF BREATH: Status: ACTIVE | Noted: 2021-09-22

## 2021-09-22 PROBLEM — D64.9 ANEMIA: Status: ACTIVE | Noted: 2021-09-22

## 2021-09-22 PROBLEM — I50.9 CHF (CONGESTIVE HEART FAILURE) (CMS/HCC): Status: ACTIVE | Noted: 2021-09-22

## 2021-09-22 LAB
ANION GAP SERPL CALC-SCNC: 11 MEQ/L (ref 3–15)
ANION GAP SERPL CALC-SCNC: 12 MEQ/L (ref 3–15)
APTT PPP: 104 SEC (ref 23–35)
APTT PPP: 64 SEC (ref 23–35)
APTT PPP: 99 SEC (ref 23–35)
ATRIAL RATE: 80
ATRIAL RATE: 81
ATRIAL RATE: 86
BASOPHILS # BLD: 0.09 K/UL (ref 0.01–0.1)
BASOPHILS NFR BLD: 1.4 %
BUN SERPL-MCNC: 13 MG/DL (ref 8–20)
BUN SERPL-MCNC: 14 MG/DL (ref 8–20)
CALCIUM SERPL-MCNC: 8.9 MG/DL (ref 8.9–10.3)
CALCIUM SERPL-MCNC: 9.7 MG/DL (ref 8.9–10.3)
CHLORIDE SERPL-SCNC: 109 MEQ/L (ref 98–109)
CHLORIDE SERPL-SCNC: 110 MEQ/L (ref 98–109)
CHOLEST SERPL-MCNC: 118 MG/DL
CO2 SERPL-SCNC: 20 MEQ/L (ref 22–32)
CO2 SERPL-SCNC: 23 MEQ/L (ref 22–32)
CREAT SERPL-MCNC: 1.2 MG/DL (ref 0.8–1.3)
CREAT SERPL-MCNC: 1.2 MG/DL (ref 0.8–1.3)
DIFFERENTIAL METHOD BLD: ABNORMAL
EOSINOPHIL # BLD: 0.22 K/UL (ref 0.04–0.54)
EOSINOPHIL NFR BLD: 3.4 %
ERYTHROCYTE [DISTWIDTH] IN BLOOD BY AUTOMATED COUNT: 24.1 % (ref 11.6–14.4)
FLUAV RNA SPEC QL NAA+PROBE: NEGATIVE
FLUBV RNA SPEC QL NAA+PROBE: NEGATIVE
FOLATE SERPL-MCNC: 11.5 NG/ML
GFR SERPL CREATININE-BSD FRML MDRD: >60 ML/MIN/1.73M*2
GFR SERPL CREATININE-BSD FRML MDRD: >60 ML/MIN/1.73M*2
GLUCOSE BLD-MCNC: 101 MG/DL (ref 70–99)
GLUCOSE BLD-MCNC: 115 MG/DL (ref 70–99)
GLUCOSE BLD-MCNC: 129 MG/DL (ref 70–99)
GLUCOSE BLD-MCNC: 90 MG/DL (ref 70–99)
GLUCOSE SERPL-MCNC: 113 MG/DL (ref 70–99)
GLUCOSE SERPL-MCNC: 82 MG/DL (ref 70–99)
HCT VFR BLDCO AUTO: 30 % (ref 40.1–51)
HDLC SERPL-MCNC: 37 MG/DL
HDLC SERPL: 3.2 {RATIO}
HGB BLD-MCNC: 8.5 G/DL (ref 13.7–17.5)
IMM GRANULOCYTES # BLD AUTO: 0.03 K/UL (ref 0–0.08)
IMM GRANULOCYTES NFR BLD AUTO: 0.5 %
LDLC SERPL CALC-MCNC: 64 MG/DL
LYMPHOCYTES # BLD: 1.72 K/UL (ref 1.2–3.5)
LYMPHOCYTES NFR BLD: 26.4 %
MAGNESIUM SERPL-MCNC: 1.8 MG/DL (ref 1.8–2.5)
MAGNESIUM SERPL-MCNC: 2 MG/DL (ref 1.8–2.5)
MCH RBC QN AUTO: 21.6 PG (ref 28–33.2)
MCHC RBC AUTO-ENTMCNC: 28.3 G/DL (ref 32.2–36.5)
MCV RBC AUTO: 76.1 FL (ref 83–98)
MONOCYTES # BLD: 0.53 K/UL (ref 0.3–1)
MONOCYTES NFR BLD: 8.1 %
NEUTROPHILS # BLD: 3.93 K/UL (ref 1.7–7)
NEUTS SEG NFR BLD: 60.2 %
NONHDLC SERPL-MCNC: 81 MG/DL
NRBC BLD-RTO: 0 %
P AXIS: 49
P AXIS: 53
P AXIS: 55
PDW BLD AUTO: 10.3 FL (ref 9.4–12.4)
PLATELET # BLD AUTO: 259 K/UL (ref 150–350)
POCT TEST: ABNORMAL
POCT TEST: NORMAL
POTASSIUM SERPL-SCNC: 3.6 MEQ/L (ref 3.6–5.1)
POTASSIUM SERPL-SCNC: 3.8 MEQ/L (ref 3.6–5.1)
PR INTERVAL: 162
PR INTERVAL: 164
PR INTERVAL: 166
QRS DURATION: 78
QRS DURATION: 82
QRS DURATION: 88
QT INTERVAL: 402
QT INTERVAL: 426
QT INTERVAL: 432
QTC CALCULATION(BAZETT): 481
QTC CALCULATION(BAZETT): 494
QTC CALCULATION(BAZETT): 498
R AXIS: -13
R AXIS: -5
R AXIS: -6
RBC # BLD AUTO: 3.94 M/UL (ref 4.5–5.8)
RSV RNA SPEC QL NAA+PROBE: NEGATIVE
SODIUM SERPL-SCNC: 141 MEQ/L (ref 136–144)
SODIUM SERPL-SCNC: 144 MEQ/L (ref 136–144)
T WAVE AXIS: 74
T WAVE AXIS: 88
T WAVE AXIS: 93
TRIGL SERPL-MCNC: 85 MG/DL (ref 30–149)
TROPONIN I SERPL-MCNC: 0.11 NG/ML
TROPONIN I SERPL-MCNC: 0.11 NG/ML
VENTRICULAR RATE: 80
VENTRICULAR RATE: 81
VENTRICULAR RATE: 86
VIT B12 SERPL-MCNC: 611 PG/ML (ref 180–914)
WBC # BLD AUTO: 6.52 K/UL (ref 3.8–10.5)

## 2021-09-22 PROCEDURE — 93010 ELECTROCARDIOGRAM REPORT: CPT | Performed by: INTERNAL MEDICINE

## 2021-09-22 PROCEDURE — 84484 ASSAY OF TROPONIN QUANT: CPT | Mod: 91 | Performed by: STUDENT IN AN ORGANIZED HEALTH CARE EDUCATION/TRAINING PROGRAM

## 2021-09-22 PROCEDURE — 63600000 HC DRUGS/DETAIL CODE: Performed by: STUDENT IN AN ORGANIZED HEALTH CARE EDUCATION/TRAINING PROGRAM

## 2021-09-22 PROCEDURE — 85025 COMPLETE CBC W/AUTO DIFF WBC: CPT | Performed by: STUDENT IN AN ORGANIZED HEALTH CARE EDUCATION/TRAINING PROGRAM

## 2021-09-22 PROCEDURE — 93005 ELECTROCARDIOGRAM TRACING: CPT | Performed by: STUDENT IN AN ORGANIZED HEALTH CARE EDUCATION/TRAINING PROGRAM

## 2021-09-22 PROCEDURE — 93005 ELECTROCARDIOGRAM TRACING: CPT | Performed by: HOSPITALIST

## 2021-09-22 PROCEDURE — 63700000 HC SELF-ADMINISTRABLE DRUG: Performed by: STUDENT IN AN ORGANIZED HEALTH CARE EDUCATION/TRAINING PROGRAM

## 2021-09-22 PROCEDURE — 200200 PR NO CHARGE: Performed by: HOSPITALIST

## 2021-09-22 PROCEDURE — 82607 VITAMIN B-12: CPT | Performed by: HOSPITALIST

## 2021-09-22 PROCEDURE — 96375 TX/PRO/DX INJ NEW DRUG ADDON: CPT

## 2021-09-22 PROCEDURE — 80048 BASIC METABOLIC PNL TOTAL CA: CPT | Performed by: STUDENT IN AN ORGANIZED HEALTH CARE EDUCATION/TRAINING PROGRAM

## 2021-09-22 PROCEDURE — 63600000 HC DRUGS/DETAIL CODE: Performed by: PHYSICIAN ASSISTANT

## 2021-09-22 PROCEDURE — 96365 THER/PROPH/DIAG IV INF INIT: CPT

## 2021-09-22 PROCEDURE — 21400000 HC ROOM AND CARE CCU/INTERMEDIATE

## 2021-09-22 PROCEDURE — 99223 1ST HOSP IP/OBS HIGH 75: CPT | Performed by: STUDENT IN AN ORGANIZED HEALTH CARE EDUCATION/TRAINING PROGRAM

## 2021-09-22 PROCEDURE — 99254 IP/OBS CNSLTJ NEW/EST MOD 60: CPT | Performed by: INTERNAL MEDICINE

## 2021-09-22 PROCEDURE — 85730 THROMBOPLASTIN TIME PARTIAL: CPT | Performed by: HOSPITALIST

## 2021-09-22 PROCEDURE — 80061 LIPID PANEL: CPT | Performed by: STUDENT IN AN ORGANIZED HEALTH CARE EDUCATION/TRAINING PROGRAM

## 2021-09-22 PROCEDURE — 85730 THROMBOPLASTIN TIME PARTIAL: CPT | Performed by: EMERGENCY MEDICINE

## 2021-09-22 PROCEDURE — 25800000 HC PHARMACY IV SOLUTIONS: Performed by: STUDENT IN AN ORGANIZED HEALTH CARE EDUCATION/TRAINING PROGRAM

## 2021-09-22 PROCEDURE — 36415 COLL VENOUS BLD VENIPUNCTURE: CPT | Performed by: EMERGENCY MEDICINE

## 2021-09-22 PROCEDURE — 83735 ASSAY OF MAGNESIUM: CPT | Performed by: STUDENT IN AN ORGANIZED HEALTH CARE EDUCATION/TRAINING PROGRAM

## 2021-09-22 PROCEDURE — 87389 HIV-1 AG W/HIV-1&-2 AB AG IA: CPT | Performed by: STUDENT IN AN ORGANIZED HEALTH CARE EDUCATION/TRAINING PROGRAM

## 2021-09-22 PROCEDURE — 93010 ELECTROCARDIOGRAM REPORT: CPT | Mod: 77 | Performed by: INTERNAL MEDICINE

## 2021-09-22 PROCEDURE — 93010 ELECTROCARDIOGRAM REPORT: CPT | Mod: 76 | Performed by: INTERNAL MEDICINE

## 2021-09-22 PROCEDURE — 82746 ASSAY OF FOLIC ACID SERUM: CPT | Performed by: HOSPITALIST

## 2021-09-22 RX ORDER — INSULIN GLARGINE 100 [IU]/ML
20 INJECTION, SOLUTION SUBCUTANEOUS NIGHTLY
Status: DISCONTINUED | OUTPATIENT
Start: 2021-09-22 | End: 2021-09-22

## 2021-09-22 RX ORDER — DEXTROSE 50 % IN WATER (D50W) INTRAVENOUS SYRINGE
25 AS NEEDED
Status: DISCONTINUED | OUTPATIENT
Start: 2021-09-22 | End: 2021-09-24 | Stop reason: HOSPADM

## 2021-09-22 RX ORDER — IBUPROFEN 200 MG
16-32 TABLET ORAL AS NEEDED
Status: DISCONTINUED | OUTPATIENT
Start: 2021-09-22 | End: 2021-09-24 | Stop reason: HOSPADM

## 2021-09-22 RX ORDER — POTASSIUM CHLORIDE 1.5 G/1.58G
40 POWDER, FOR SOLUTION ORAL ONCE
Status: COMPLETED | OUTPATIENT
Start: 2021-09-22 | End: 2021-09-22

## 2021-09-22 RX ORDER — FUROSEMIDE 20 MG/1
20 TABLET ORAL
Status: DISCONTINUED | OUTPATIENT
Start: 2021-09-23 | End: 2021-09-22

## 2021-09-22 RX ORDER — GUAIFENESIN 100 MG/5ML
200 SOLUTION ORAL EVERY 4 HOURS PRN
Status: DISCONTINUED | OUTPATIENT
Start: 2021-09-22 | End: 2021-09-24 | Stop reason: HOSPADM

## 2021-09-22 RX ORDER — POTASSIUM CHLORIDE 750 MG/1
40 TABLET, FILM COATED, EXTENDED RELEASE ORAL AS NEEDED
Status: CANCELLED | OUTPATIENT
Start: 2021-09-22

## 2021-09-22 RX ORDER — INSULIN ASPART 100 [IU]/ML
6-10 INJECTION, SOLUTION INTRAVENOUS; SUBCUTANEOUS
Status: DISCONTINUED | OUTPATIENT
Start: 2021-09-22 | End: 2021-09-22

## 2021-09-22 RX ORDER — POTASSIUM CHLORIDE 14.9 MG/ML
20 INJECTION INTRAVENOUS AS NEEDED
Status: CANCELLED | OUTPATIENT
Start: 2021-09-22

## 2021-09-22 RX ORDER — POTASSIUM CHLORIDE 750 MG/1
20 TABLET, FILM COATED, EXTENDED RELEASE ORAL AS NEEDED
Status: CANCELLED | OUTPATIENT
Start: 2021-09-22

## 2021-09-22 RX ORDER — FUROSEMIDE 10 MG/ML
40 INJECTION INTRAMUSCULAR; INTRAVENOUS ONCE
Status: COMPLETED | OUTPATIENT
Start: 2021-09-22 | End: 2021-09-22

## 2021-09-22 RX ORDER — PANTOPRAZOLE SODIUM 20 MG/1
20 TABLET, DELAYED RELEASE ORAL DAILY
Status: DISCONTINUED | OUTPATIENT
Start: 2021-09-22 | End: 2021-09-24 | Stop reason: HOSPADM

## 2021-09-22 RX ORDER — DEXTROSE 40 %
15-30 GEL (GRAM) ORAL AS NEEDED
Status: DISCONTINUED | OUTPATIENT
Start: 2021-09-22 | End: 2021-09-24 | Stop reason: HOSPADM

## 2021-09-22 RX ORDER — INSULIN ASPART 100 [IU]/ML
3-5 INJECTION, SOLUTION INTRAVENOUS; SUBCUTANEOUS
Status: DISCONTINUED | OUTPATIENT
Start: 2021-09-22 | End: 2021-09-24 | Stop reason: HOSPADM

## 2021-09-22 RX ORDER — SACUBITRIL AND VALSARTAN 24; 26 MG/1; MG/1
1 TABLET, FILM COATED ORAL 2 TIMES DAILY
Status: DISCONTINUED | OUTPATIENT
Start: 2021-09-22 | End: 2021-09-24

## 2021-09-22 RX ORDER — BENZONATATE 100 MG/1
200 CAPSULE ORAL 3 TIMES DAILY PRN
Status: DISCONTINUED | OUTPATIENT
Start: 2021-09-22 | End: 2021-09-24 | Stop reason: HOSPADM

## 2021-09-22 RX ORDER — ATORVASTATIN CALCIUM 40 MG/1
40 TABLET, FILM COATED ORAL
Status: DISCONTINUED | OUTPATIENT
Start: 2021-09-22 | End: 2021-09-24 | Stop reason: HOSPADM

## 2021-09-22 RX ORDER — GABAPENTIN 300 MG/1
300 CAPSULE ORAL 3 TIMES DAILY
Status: DISCONTINUED | OUTPATIENT
Start: 2021-09-22 | End: 2021-09-24 | Stop reason: HOSPADM

## 2021-09-22 RX ORDER — FUROSEMIDE 10 MG/ML
20 INJECTION INTRAMUSCULAR; INTRAVENOUS
Status: DISCONTINUED | OUTPATIENT
Start: 2021-09-23 | End: 2021-09-23

## 2021-09-22 RX ADMIN — FUROSEMIDE 40 MG: 10 INJECTION, SOLUTION INTRAMUSCULAR; INTRAVENOUS at 18:21

## 2021-09-22 RX ADMIN — SODIUM CHLORIDE 125 MG: 9 INJECTION, SOLUTION INTRAVENOUS at 12:14

## 2021-09-22 RX ADMIN — GABAPENTIN 300 MG: 300 CAPSULE ORAL at 15:46

## 2021-09-22 RX ADMIN — MAGNESIUM SULFATE 1 G: 1 INJECTION INTRAVENOUS at 10:02

## 2021-09-22 RX ADMIN — ATORVASTATIN CALCIUM 40 MG: 40 TABLET, FILM COATED ORAL at 18:21

## 2021-09-22 RX ADMIN — GABAPENTIN 300 MG: 300 CAPSULE ORAL at 09:07

## 2021-09-22 RX ADMIN — GABAPENTIN 300 MG: 300 CAPSULE ORAL at 21:14

## 2021-09-22 RX ADMIN — SACUBITRIL AND VALSARTAN 1 TABLET: 24; 26 TABLET, FILM COATED ORAL at 09:08

## 2021-09-22 RX ADMIN — METOPROLOL SUCCINATE 12.5 MG: 25 TABLET, EXTENDED RELEASE ORAL at 09:08

## 2021-09-22 RX ADMIN — POTASSIUM CHLORIDE 40 MEQ: 1.5 FOR SOLUTION ORAL at 10:02

## 2021-09-22 RX ADMIN — PANTOPRAZOLE SODIUM 20 MG: 20 TABLET, DELAYED RELEASE ORAL at 09:08

## 2021-09-22 RX ADMIN — SACUBITRIL AND VALSARTAN 1 TABLET: 24; 26 TABLET, FILM COATED ORAL at 21:14

## 2021-09-22 RX ADMIN — HEPARIN SODIUM 1650 UNITS/HR: 10000 INJECTION, SOLUTION INTRAVENOUS at 07:27

## 2021-09-22 ASSESSMENT — COGNITIVE AND FUNCTIONAL STATUS - GENERAL
MOVING TO AND FROM BED TO CHAIR: 4 - NONE
HELP NEEDED FOR BATHING: 3 - A LITTLE
DRESSING REGULAR UPPER BODY CLOTHING: 4 - NONE
EATING MEALS: 4 - NONE
DRESSING REGULAR LOWER BODY CLOTHING: 4 - NONE
HELP NEEDED FOR PERSONAL GROOMING: 4 - NONE
STANDING UP FROM CHAIR USING ARMS: 4 - NONE
WALKING IN HOSPITAL ROOM: 4 - NONE
TOILETING: 4 - NONE
CLIMB 3 TO 5 STEPS WITH RAILING: 4 - NONE
DO YOU HAVE SERIOUS DIFFICULTY WALKING OR CLIMBING STAIRS: NO

## 2021-09-22 ASSESSMENT — ENCOUNTER SYMPTOMS
VOMITING: 0
FEVER: 0
COUGH: 1
CHEST TIGHTNESS: 0
ABDOMINAL PAIN: 0
NAUSEA: 0
PALPITATIONS: 0
CHILLS: 0
WHEEZING: 0
SHORTNESS OF BREATH: 1

## 2021-09-22 ASSESSMENT — PATIENT HEALTH QUESTIONNAIRE - PHQ9: SUM OF ALL RESPONSES TO PHQ9 QUESTIONS 1 & 2: 0

## 2021-09-22 NOTE — PROGRESS NOTES
Spoke with patient and confirmed with medication list from [facility] and/or patient's own pharmacy to complete the medication reconciliation.     Comments about home medications:  -patient states he takes gabapentin 300 mg, Lantus, and metformin 1000 mg daily but could not recall what pharmacy he goes to. Called multiple pharmacies, unable to verify fills.

## 2021-09-22 NOTE — ASSESSMENT & PLAN NOTE
Presented with SOB and nonproductive cough  for 1 week.   Recently admitted 9/13 at [facility] for acute PE and anemia. IVC filter placed 9/16. Pt eloped before receiving any medications, not currently on anticoagulation at home.   CT PE at [facility] notable for acute PE in left upper lobe branch and DVT studies notable for right posterior tibial, peroneal, popliteal and dstal thigh femoral vein. CT PE on admission (9/22) showed small B/L pleural effusions (R>L), no evidence of PE or RV strain.  TTE at [facility] notable for EF 25-30%  SOB likely to be secondary to CHF exacerbation.   BNP 1476   Trop 0.11 x3, downtrending since prior admission    - Pt transitioned off heparin gtt, started rivaroxaban 15mg PO BID   - Cardiology consulted, with thanks  - Continue diuresis with furosemide 20mg qD

## 2021-09-22 NOTE — ASSESSMENT & PLAN NOTE
Admitted at [facility] on 9/13 for acute PE/DVT  CT PE notable for acute PE in let upper lobe branch and DVT studies notable for right posterior tibial, peroneal, popliteal and dstal thigh femoral vein.  CT PE in [facility] shows no signs of emobli or RV strain  Pt was suppose to be discharged on apixaban however eloped before having any meds filled.   IVC filter placed 9/16    - Transitioned from heparin gtt to rivaroxaban  - Will need outpatient pulmonary follow-up

## 2021-09-22 NOTE — ASSESSMENT & PLAN NOTE
On admission to [facility] noted to have iron deficiency with reports of tarry stools and endorsed daily NSAID use. Hgb 6.4 requiring 2U PRBC while admitted. GI consulted and pt was scheduled for EGD/[facility] on 9/17 at [place] [facility] however eloped prior to procedure.   CT A/P at [place] [facility] notable for peculiar appearance of fluid in perihepatic space. Consider ascites vs. Small peritoneal hemorrhage although typical hemorrhage would demonstrate attenuation that is higher than that of fluid at the time of imaging  Hemoglobin currently stable at 8.9. No signs of active bleeding  Pt reports prior colonoscopy in Nov 2020 before prostatectomy as well as age-appropriate cancer screening at 50    - Continue IV iron, prior iron studies at [facility] consistent with EVAN  - Trend CBC daily  - Transfuse Hgb >7  - If patient has acute drops in Hgb requiring transfusion, consider GI consult for EGD/Gibson as inpatient. EGD/[facility]son as outpatient if CBC remains stable

## 2021-09-22 NOTE — CONSULTS
CARDIOLOGY CONSULTATION NOTE     Patient: Enrike Napoles YOB: 1960   MRN: [id number] Date of Admission: 9/21/2021   Length of Stay: 0      REASON FOR CONSULT     HFrEF    ASSESSMENT AND RECOMMENDATIONS     61 y.o. male with history of recently diagnosed DVT/PE s/p IVC filter, HFrEF (25-30%), DM2, HTN, prostate CA s/p prostatectomy who presents to [facility] ED 9/21 with SOB found to have small bilateral pleural effusions.    # Acute decompensated HF (EF 25-30%),   - etiology of HFrEF unclear -most likely ICM however no evaluation yet, possibly also uncontrolled HTN, no hx of tacchyarrhythmia. Patient should have ischemic eval and basic lab work completed.  -etiology for exacerbation: medication non-compliance/non-optimization  - NYHA Class: III   - Volume status: warm and wet   - Cardiologist: Dr. Atkinson (not yet seen)  - BNP: 1476  - trop: 0.011- plateaued  - Cr 1.2 on admission (stable from [place])  - Dry weight = unknown - 231 lb at [place] when euvolemic per notes (admission weight =230 lb)   - I/O in past 24 hours: 2.7 L UO    - TTE (last 9/13/21): showed EF25-30%, global hypokinesis minimal segmental variation, moderate MR, PA systolic pressure moderately elevated (50 mmHG)  - CXR on admission = no active disease  -CT Chest- No PE, small bilateral pleural effusions right greater than left.  Mild coronary artery calcification  - EKG on admission = NSR @86BPM, RI interval not prolonged, QRS narrow, QTc 481. Left axis deviation, Q wave in inferior leads, No acute ST-T wave changes.  -home med: entresto 24-26 BD  - s/p IV lasix 40 mg in ED (lasix naiive)   - patient has high STOP-BANG score  PLAN  - Strict I/Os, Daily Weights   - Cardiac diet: 2 g Na, 2 L fluids, daily electrolytes replete for goal K > 4.0, Mg > 2.0   - Continuous telemetry 48 hrs: re-assess need for daily.   - strict ins and out, daily standing AM weights, fluid/sodium restriction  - limit use of NSAIDs, watch for ototoxicity on IV  diuretics   - Supplemental O2 to maintain SpO2 >90%   - Admission meds:    - BB: would start carvedilol given persistent hypertension. 3.125 BD and uptitrate as tolerated  -would restart Entresto 24-26 mg BD  - Diuretic: IV lasix 40 mg - continue with goal net negative 1.5-2L today  - not candidate for SGLT2 therapy given incontinence at baseline  - cardiomyopathy w/u: HIV, TSH, LISANDRA, ferritin, UDS.  - should have anemia work up - noted to have melena at last admission. Consider GI consult. Would check iron studies and supplement IV while inpatient.   - patient should undergo ischemic eval while here - favor nuclear stress test.  - would consider sleep study - patient screens in high risk for YAMILETH. TTE also with elevated PASP.   -We will need repeat TTE once on maximal goal-directed medical therapy, to guide future discussions about primary prevention ICD placement  - At time of discharge: outpatient follow-up for 7 days after discharge     #HTN  - continue BB and entresto as above- uptitrate as tolerated   - YAMILETH evaluation    #T2DM  - recent A1C 7.4  - cholesterol panel: total cholesterol 118, TG, HDL 37, LDL: 64  - insulin dependent  - management per primary team  - continue high-intensity statin, cholesterol panel reasonable.    Thank you for the opportunity to participate in this patient's care.  Please call with any questions or concerns. Please note that all recommendations are preliminary until attending attestation.     --  Radha Casillas MD  PGY-4 Fellow in Cardiovascular Medicine      HISTORY OF PRESENT ILLNESS     Enrike Napoles is a 61 y.o. male with history of recently diagnosed DVT/PE s/p IVC filter, HFrEF (25-30%), DM2, HTN, prostate CA s/p prostatectomy who presents to [facility] ED  with SOB found to have small bilateral pleural effusions.    Patient recently presented to [facility] 2021 with right lower extremity swelling/dyspnea found to have small acute PE (left upper lobe branch) and extensive right  lower extremity DVT.  There is no evidence of right heart strain on TTE however EF 25-30%.  Cardiology saw patient as inpatient and initiated entresto 24-26 BD. Ischemic work-up (NST) was planned however patient eloped prior to completion. He was anticoagulated with heparin with plan to switch to apixaban however patient developed acute on chronic anemia and melena -IVC filter was placed 9/16.  He was planned for EGD colonoscopy but eloped prior to this being done.  He was referred to Dr. Atkinson (appt 10/7).     Patient does not follow with any doctors and seems to be noncompliant. He also never had the blood thinner, BB, entresto etc. filled. He returns now with increased shortness of breath with minimal exertion, and slightly increased lower extremity edema.  At baseline he reports no exercise limitations can walk on flat ground several blocks, can do 2 flights of stairs without stopping.  In last month or so patient has had shortness of breath with minimal exertion, such as changing position and has had limited activity. He also has noted new lightheadedness and palpitations with exertion, as well as orthopnea and PND.  He denies chest pain, syncope. he states that he has lost weight since discharged from [facility], and 20 pounds weight loss overall in the past month given poor appetite.  He does not regularly check his blood pressure at home, but states that his blood pressure usually runs high. He denies any melena/[place] bleeding, any other areas of bleeding etc.  He has chronic incontinence related to prior prostatectomy and wears Pampers at baseline.  He is eager to reestablish care with urology.     CT with no evidence of PE, but small bilateral pleural effusions right greater than leftT PE in ED. On admission he was restarted on heparin gtt; diuresed with IV lasix 40 mg-with effective diuresis.     SH: Patient is a prior smoker (1PPD)- recently quit, denies alcohol or drug use. No prior drug use.    Review of  "Systems:  A 14-point review of systems is negative except as noted in the HPI.    MEDICAL, SURGICAL, FAMILY, AND SOCIAL HISTORY     Past Medical History:   Diagnosis Date   • Hypertension    • Type 2 diabetes mellitus (CMS/HCC)        Past Surgical History:   Procedure Laterality Date   • PROSTATE SURGERY       Family History   Problem Relation Age of Onset   • Diabetes Biological Mother    • Diabetes Maternal Grandmother      Social History     Tobacco Use   • Smoking status: Current Some Day Smoker     Types: Cigarettes   • Smokeless tobacco: Never Used   Substance Use Topics   • Alcohol use: No       ALLERGIES/DRUG REACTIONS/SIDE EFFECTS     Allergies   Allergen Reactions   • No Known Allergies         MEDICATIONS     (Not in a hospital admission)  [unfilled]    PHYSICAL EXAMINATION     Vitals:   Visit Vitals  BP (!) 154/98   Pulse 80   Temp 36.8 °C (98.3 °F) (Oral)   Resp (!) 21   Ht 1.88 m (6' 2\")   Wt 104 kg (230 lb)   SpO2 97%   BMI 29.53 kg/m²      Temp:  [36.8 °C (98.3 °F)] 36.8 °C (98.3 °F)  Heart Rate:  [79-85] 80  Resp:  [19-29] 21  BP: ()/() 154/98  I/O last 3 completed shifts:  In: -   Out: 2700 [Urine:2700]    Intake/Output Summary (Last 24 hours) at 9/22/2021 0659  Last data filed at 9/22/2021 0444  24 Hour Net Input/Output from 7AM Yesterday   Intake --   Output 2700 ml   Net -2700 ml        Wt Readings from Last 3 Encounters:   09/21/21 104 kg (230 lb)   05/25/18 107 kg (235 lb)      Physical Exam  Constitutional:       General: He is not in acute distress.     Appearance: Normal appearance. He is normal weight. He is not toxic-appearing.   HENT:      Mouth/Throat:      Mouth: Mucous membranes are moist.   Neck:      Comments: JVD - approx 7 cm  Cardiovascular:      Rate and Rhythm: Normal rate and regular rhythm.      Heart sounds: Murmur heard.        Comments: Soft systolic murmur  Pulmonary:      Effort: Pulmonary effort is normal. No respiratory distress.      Breath sounds: No " wheezing or rales.      Comments: Decreased breath sounds at bases bilaterally  Abdominal:      General: Abdomen is flat. Bowel sounds are normal. There is no distension.      Palpations: Abdomen is soft.   Musculoskeletal:         General: Tenderness present.      Cervical back: Neck supple.      Right lower leg: Edema present.      Left lower leg: No edema.      Comments: RLE tenderness   Skin:     General: Skin is warm and dry.   Neurological:      Mental Status: He is alert.          LABORATORY DATA     Results from last 7 days   Lab Units 09/22/21  0610 09/21/21  1847   SODIUM mEQ/L  --  139   CHOLESTEROL mg/dL 118  --    CO2 mEQ/L  --  20*   BUN mg/dL  --  15   CALCIUM mg/dL  --  9.2   ALT IU/L  --  32   AST IU/L  --  28     Results from last 7 days   Lab Units 09/21/21  1847   WBC K/uL 6.82   PLATELETS K/uL 276     Results from last 7 days   Lab Units 09/22/21  0156 09/21/21  1847   PTT sec 99* 31   INR   --  1.1   PROTIME sec  --  14.0           No lab exists for component: CPK      Results from last 7 days   Lab Units 09/22/21  0610   CHOLESTEROL mg/dL 118   TRIGLYCERIDES mg/dL 85         Results from last 7 days   Lab Units 09/21/21  1847   BNP pg/mL 1,476*       ELECTROCARDIOGRAPHIC / CIED ASSESSMENTS     ECG   NSR @86BPM, NH interval not prolonged, QRS narrow, QTc 481. Left axis deviation, Q wave in inferior leads, No acute ST-T wave changes.     Telemetry   NSR, sinus bradycardia in 50's overnight, with frequent bigeminy, PACs, PVCs    DIAGNOSTIC IMAGING / PROCEDURES     TTE: 9/13 ([place])  •  A complete transthoracic echocardiogram (including 2D, color flow   Doppler, spectral Doppler, M-mode and strain imaging) was performed using   the standard protocol. The study quality was adequate.   •  The left ventricle is mildly dilated. There is global hypokinesis with   minimal segmental variation. Severely decreased left ventricular ejection   fraction. The left ventricular ejection fraction by visual  estimate is 25%   to 30%. The average global longitudinal peak systolic strain is reduced.   •  The right ventricle is normal in size. There is normal function of the   right ventricle.   •  The left atrium is mildly dilated.   •  The right atrium is mildly dilated.   •  There is moderate mitral regurgitation. There is no mitral stenosis.   •  The aortic valve is normal in structure and trileaflet. There is no   aortic stenosis. There is no aortic regurgitation.   •  The tricuspid valve is normal in structure. There is mild tricuspid   regurgitation. Pulmonary artery systolic pressure measures 50 mmHg.The   pulmonary artery systolic pressure is moderately elevated.   •  The aortic root is mildly enlarged. The proximal segment of the   ascending aorta is mildly enlarged.   •  Trivial pericardial effusion present.   •  The inferior vena cava is dilated (diameter >21 mm) and decreases <50%   in size with inspiration, suggesting an elevated right atrial pressure of   15 mmHg (range 10-20 mm Hg).   •  There is no prior study available for comparison at this organization.      Cath: no prior     Stress: no prior    Radiologic Studies:  Chest X-Ray 9/21: No acute cardiopulmonary process   CTPE:    1. No evidence of pulmonary emboli, as questioned clinically.  2. Low lung volumes. Hypoventilatory changes with suspected air trapping. Please  see above discussion.  3. Small bilateral pleural effusions, right greater than left.  4. Cardiomegaly. Mild coronary artery calcifications.    CT Chest 9/21:  1. No evidence of pulmonary emboli, as questioned clinically.  2. Low lung volumes. Hypoventilatory changes with suspected air trapping. Please  see above discussion.  3. Small bilateral pleural effusions, right greater than left.  4. Cardiomegaly. Mild coronary artery calcifications.       CT Chest w/IV Contrast: (9/13)  1.  Acute segmental left upper lobe pulmonary embolus possibly with findings of chronic thromboembolic disease  in the right lower lobe.  Study is limited by respiratory motion.   2.  Cardiomegaly with vascular congestion and bilateral pleural effusions.   3.  Bilateral gynecomastia.       Signed:      Radha Casillas MD, Fellow in Cardiovascular Medicine    Cell: [phone]

## 2021-09-22 NOTE — PROGRESS NOTES
Internal Medicine  Daily Progress Note       SUBJECTIVE   This is a 61 y.o. year-old male admitted on 9/21/2021 with shortness of breath, cough, recent diagnosis of PE/DVT, and HFrEF (EF 25-30%).     Interval History: No acute events since admission overnight. Pt endorses continued SOB and cough, as well as chest tightness this morning. Has not been out of bed to ambulate since arriving to the ED but denies any dizziness or lightheadedness prior to admission. He endorsed right leg swelling since his discharge from [place].   Pt notes he lives alone, denied any known sick contacts, no recent travel.   Pt denies any dark or bloody stools. States his last colonoscopy and EGD were prior to his prostatectomy in November last year.      OBJECTIVE   Vital signs in last 24 hours:  Temp:  [36.8 °C (98.3 °F)] 36.8 °C (98.3 °F)  Heart Rate:  [77-85] 80  Resp:  [16-29] 16  BP: ()/() 145/74  SpO2:  [93 %-100 %] 100 %  Oxygen Therapy: None (Room air)    Weight & I/Os:  Weights (last 5 days)     Date/Time Weight    09/21/21 1841 104 kg (230 lb)          Intake/Output Summary (Last 24 hours) at 9/22/2021 0659  Last data filed at 9/22/2021 0444  24 Hour Net Input/Output from 7AM Yesterday   Intake --   Output 2700 ml   Net -2700 ml        PHYSICAL EXAMINATION   Physical Exam  Vitals reviewed.   Constitutional:       General: He is not in acute distress.  HENT:      Head: Normocephalic and atraumatic.      Mouth/Throat:      Mouth: Mucous membranes are moist.   Eyes:      Conjunctiva/sclera: Conjunctivae normal.   Cardiovascular:      Rate and Rhythm: Regular rhythm. Tachycardia present.      Pulses: Normal pulses.      Heart sounds: Normal heart sounds.   Pulmonary:      Effort: Pulmonary effort is normal.      Breath sounds: Normal breath sounds.   Abdominal:      Palpations: Abdomen is soft.      Tenderness: There is no abdominal tenderness.   Musculoskeletal:      Right lower leg: Edema present.      Left lower leg:  Edema present.      Comments: Pitting edema bilateral LE, R>L   Neurological:      General: No focal deficit present.      Mental Status: He is alert.          LINES, CATHETERS, DRAINS, AIRWAYS, & WOUNDS   Lines, drains, airways, & wounds:  Peripheral IV (Adult) 09/21/21 Left Wrist (Active)   Number of days: 1        LABS, IMAGING, & TELE   Labs:  I have reviewed the patient's labs to the time of note. No new clinical concern.    Results from last 7 days   Lab Units 09/22/21  0804 09/21/21  1847   WBC K/uL 6.52 6.82   HEMOGLOBIN g/dL 8.5* 8.9*   HEMATOCRIT % 30.0* 31.6*   PLATELETS K/uL 259 276       Results from last 7 days   Lab Units 09/22/21  0804 09/21/21  1847   SODIUM mEQ/L 141 139   POTASSIUM mEQ/L 3.6 4.0   CHLORIDE mEQ/L 110* 109   CO2 mEQ/L 20* 20*   BUN mg/dL 14 15   CREATININE mg/dL 1.2 1.2   CALCIUM mg/dL 8.9 9.2   ALBUMIN g/dL  --  3.2*   BILIRUBIN TOTAL mg/dL  --  0.5   ALK PHOS IU/L  --  78   ALT IU/L  --  32   AST IU/L  --  28   GLUCOSE mg/dL 82 140*     Imaging personally reviewed (does not include unread studies):  X-RAY CHEST 1 VIEW    Result Date: 9/21/2021  IMPRESSION: No active disease in the chest. COMMENT: The current portable study the chest was compared to that dated 5/24/2006 The lungs are well aerated and clear.  The cardiomediastinal silhouette is normal in size and configuration.  The costophrenic sulci and bony thorax are intact.    CT CHEST PULMONARY EMBOLISM WITH IV CONTRAST    Result Date: 9/22/2021  IMPRESSION: 1. No evidence of pulmonary emboli, as questioned clinically. 2. Low lung volumes. Hypoventilatory changes with suspected air trapping. Please see above discussion. 3. Small bilateral pleural effusions, right greater than left. 4. Cardiomegaly. Mild coronary artery calcifications. Preliminary results issued by Dr. Garnett at 2309 hours on 9/21/2021.    Telemetry/EKG:  No acute events overnight     ASSESSMENT & PLAN   Shortness of breath  Assessment & Plan  Presented with  SOB and nonproductive cough  for 1 week.   Recently admitted 9/13 at [facility] for acute PE and anemia. IVC filter placed 9/16. Pt eloped before receiving any medications, not currently on anticoagulation at home.   CT PE at [place] notable for acute PE in left upper lobe branch and DVT studies notable for right posterior tibial, peroneal, popliteal and dstal thigh femoral vein. CT PE on admission (9/22) showed small B/L pleural effusions (R>L), no evidence of PE or RV strain.  TTE at [facility] notable for EF 25-30%  SOB likely to be secondary to acute PE vs. CHF exacerbation (given EF of 25-30%). Received furosemide 40mg x1 in ED.   BNP 1476   Trop 0.11 x3, downtrending since prior admission    - Continue heparin gtt with plan to transition to apixaban if Hgb stable  - Cardiology consulted, with thanks  - Continue diuresis with furosemide 20mg BID, monitor volume status    * CHF (congestive heart failure) (CMS/[facility])  Assessment & Plan  TTE on prior admission at [facility] - EF 25-30%. Seen by cardiology that admission who recommended adding Toprol XL to regimen with low dose diuretic. Patient left the hospital before he could be started on meds.   Patient now presenting with SOB at rest and with exertion and orthopnea, does not appear significantly volume overloaded on exam.   BNP 1476    - Continue diuresis  - Started on metoprolol 12.5 and entresto 24-26mg  - Cardiology consulted, appreciate input  - Outpatient follow up with cardiology        Anemia  Assessment & Plan  On admission to [facility] noted to have iron deficiency with reports of tarry stools and endorsed daily NSAID use. Hgb 6.4 requiring 2U PRBC while admitted. GI consulted and pt was scheduled for EGD/[place] on 9/17 at [facility] however eloped prior to procedure.   CT A/P at [facility] notable for peculiar appearance of fluid in perihepatic space. Consider ascites vs. Small peritoneal hemorrhage although typical hemorrhage would demonstrate attenuation that is higher than that of fluid  at the time of imaging  Hemoglobin currently stable at 8.9. No signs of active bleeding  Pt reports prior colonoscopy in Nov 2020 before prostatectomy as well as age-appropriate cancer screening at 49    - Start IV iron, prior iron studies at [place] consistent with EVAN  - Trend CBC daily  - Transfuse Hgb >7  - If patient has acute drops in Hgb requiring transfusion, consider GI consult for EGD/[place] as inpatient.   - EGD/[place] as outpatient if CBC remains stable          History of pulmonary embolism  Assessment & Plan  Admitted at [place] on 9/13 for acute PE/DVT  CT PE notable for acute PE in let upper lobe branch and DVT studies notable for right posterior tibial, peroneal, popliteal and dstal thigh femoral vein.  CT PE in [facility] ED shows no signs of emobli or RV strain  Pt was suppose to be discharged on apixaban however eloped before having any meds filled.   IVC filter placed 9/16    - Continue heparin gtt with plans to transition to apixaban  - Will need outpatient pulmonary follow-up      Type 2 diabetes mellitus, with long-term current use of insulin (CMS/[facility])  Assessment & Plan  A1C 7.4 on 9/15/21  Patient reports home regimen as 20-30 units glargine and metformin 1000mg daily. Intermittently takes insulin as needed.     - Hold glargine as AM BG 80s, continue to monitor  - Continue SSI and POCT glucose  - Diabetic diet    - Consider empagliflozin on discharge given pt's hx of DM as well as HFrEF       VTE Assessment: [place]    VTE Prophylaxis: Current anticoagulants:  heparin (porcine) bolus from bag 3,650-7,250 Units, intravenous, q6h PRN  heparin infusion in 0.45%  units/mL, intravenous, Titrated      Code Status: Full Code  Estimated discharge date:      ATTENDING DOCUMENTATION  ALSO SEE ATTENDING ATTESTATION SECTION OF NOTE

## 2021-09-22 NOTE — H&P
Internal Medicine  History & Physical        CHIEF COMPLAINT   Shortness of Breath     HISTORY OF PRESENT ILLNESS      This is a 61 y.o. male with a past medical history of prostate cancer s/p prostatectomy (lost to follow up), DM 2 (insulin dependent), HTN and chronic low back pain who presents with shortness of breath.     Patient notes he had shortness of breath for about 1 week while he was admitted at [facility] for an acute PE. He notes SOB at rest and with exertion, however it has been unchanged for the past week. Patient endorses orthopnea, but denies PND and leg swelling. He notes a nonproductive cough for the past week and reports that it awakens him at night. He denies any fever,chills, hemoptysis and syncope. Patient does note that he had a prostatectomy in Nov 2021 after being diagnosed with prostate cancer in July, however he was lost to follow up. He denies any recent travel or other recent surgeries in the past 3 months. Patient also notes he smokes about a pack a day but has recently tried to cut down.     Of note, patient was recently admitted at [facility] on 9/13/21 with 4-5 days of R LE swelling and pain and dyspnea at rest. Imaging notable for acute PE in left upper lobe branch and DVT in right posterior tibial, peroneal, popliteal and distal thigh femoral vein. TTE negative for right heart strain and EF 25-30%. Maintained on heparin drip with plans to switch to apixaban however patient eloped and did not wait until his meds were filled. However, patient did have an IVC filter placed on 9/16. Hospital course was c/b by anemia with reports of tarry stools (hgb 6.4 requiring 2U PRBC). Planned for EGD/[facility] on 9/17 however he eloped prior to procedure. Of note he does endorse NSAID use for chronic back pain.     In the ED vitals were /89 - 162/93, HR 80-84, RR 29-23, SpO2  on RA.   Labs notable for Na 139, Creat 1.2 (base ~1.1-1.2), Glucose 140, Trop 0.11, BNP 1,476, Hgb 8.9.   CXR showed no  active disease  CT PE: Small B/L pleural effusions (R>L), No filling defects in pulmonary arteries to suggest emboli and no RV strain. LV is moderately dilated. Thickened left adrenal gland and 3cm hypodense cyst in left kidney  In ED patient was administered 1x furosemide 40mg and started on a heparin gtt.     PAST MEDICAL AND SURGICAL HISTORY      PMHx:  Past Medical History:   Diagnosis Date   • Hypertension    • Type 2 diabetes mellitus (CMS/HCC)        PSHx:  Past Surgical History:   Procedure Laterality Date   • PROSTATE SURGERY         PCP:   Unable, To Obtain Pcp    MEDICATIONS      Prior to Admission medications    Medication Sig Start Date End Date Taking? Authorizing Provider   gabapentin (NEURONTIN) 300 mg capsule Take 300 mg by mouth 3 times daily. 9/17/21  Yes Kim Torres MD   insulin glargine U-100 (LANTUS SOLOSTAR U-100 INSULIN) 100 unit/mL (3 mL) pen Inject 40 Units under the skin. 9/17/21  Yes Kim Torres MD   metFORMIN (GLUMETZA) 1,000 mg 24 hr tablet Take 1,000 mg by mouth daily. 9/17/21  Yes Kim Torres MD   naproxen (NAPROSYN) 250 mg tablet Take 250 mg by mouth. 9/17/21  Yes Kim Torres MD   omeprazole (PriLOSEC) 20 mg capsule Take 20 mg by mouth. 9/17/21  Yes Kim Torres MD   ALPRAZolam (XANAX) 1 mg tablet Take 1 mg by mouth 2 (two) times a day as needed. 5/16/18   Kim Torres MD       Home medications were personally reviewed.    ALLERGIES      No known allergies    FAMILY HISTORY      Family History   Problem Relation Age of Onset   • Diabetes Biological Mother    • Diabetes Maternal Grandmother        SOCIAL HISTORY      Social History     Socioeconomic History   • Marital status: Single     Spouse name: None   • Number of children: None   • Years of education: None   • Highest education level: None   Occupational History   • None   Tobacco Use   • Smoking status: Current Some Day Smoker     Types: Cigarettes   • Smokeless tobacco:  Never Used   Substance and Sexual Activity   • Alcohol use: No   • Drug use: No   • Sexual activity: None   Other Topics Concern   • None   Social History Narrative   • None     Social Determinants of Health     Financial Resource Strain:    • Difficulty of Paying Living Expenses: Not on file   Food Insecurity: No Food Insecurity   • Worried About Running Out of Food in the Last Year: Never true   • Ran Out of Food in the Last Year: Never true   Transportation Needs:    • Lack of Transportation (Medical): Not on file   • Lack of Transportation (Non-Medical): Not on file   Physical Activity:    • Days of Exercise per Week: Not on file   • Minutes of Exercise per Session: Not on file   Stress:    • Feeling of Stress : Not on file   Social Connections:    • Frequency of Communication with Friends and Family: Not on file   • Frequency of Social Gatherings with Friends and Family: Not on file   • Attends Holiness Services: Not on file   • Active Member of Clubs or Organizations: Not on file   • Attends Club or Organization Meetings: Not on file   • Marital Status: Not on file   Intimate Partner Violence:    • Fear of Current or Ex-Partner: Not on file   • Emotionally Abused: Not on file   • Physically Abused: Not on file   • Sexually Abused: Not on file   Housing Stability:    • Unable to Pay for Housing in the Last Year: Not on file   • Number of Places Lived in the Last Year: Not on file   • Unstable Housing in the Last Year: Not on file       REVIEW OF SYSTEMS      Review of Systems   Constitutional: Negative for chills and fever.   Respiratory: Positive for cough and shortness of breath. Negative for chest tightness and wheezing.    Cardiovascular: Negative for chest pain, palpitations and leg swelling.   Gastrointestinal: Negative for abdominal pain, nausea and vomiting.   Neurological: Negative for syncope.       PHYSICAL EXAMINATION      Temp:  [36.8 °C (98.3 °F)] 36.8 °C (98.3 °F)  Heart Rate:  [80-85] 80  Resp:   [19-29] 19  BP: ()/() 168/81  Body mass index is 29.53 kg/m².    Physical Exam  Eyes:      Conjunctiva/sclera: Conjunctivae normal.   Cardiovascular:      Rate and Rhythm: Normal rate and regular rhythm.      Heart sounds: Normal heart sounds.   Pulmonary:      Comments: Decreaesd BS at bases B/L  Abdominal:      Palpations: Abdomen is soft.      Tenderness: There is no abdominal tenderness.   Musculoskeletal:      Right lower leg: No edema.      Left lower leg: No edema.   Skin:     General: Skin is warm and dry.   Neurological:      Mental Status: He is alert.   Psychiatric:         Mood and Affect: Mood normal.         LABS / IMAGING / EKG        Labs:  Results from last 7 days   Lab Units 09/21/21  1847   WBC K/uL 6.82   HEMOGLOBIN g/dL 8.9*   HEMATOCRIT % 31.6*   PLATELETS K/uL 276     Results from last 7 days   Lab Units 09/21/21  1847   SODIUM mEQ/L 139   POTASSIUM mEQ/L 4.0   CHLORIDE mEQ/L 109   CO2 mEQ/L 20*   BUN mg/dL 15   CREATININE mg/dL 1.2   CALCIUM mg/dL 9.2   ALBUMIN g/dL 3.2*   BILIRUBIN TOTAL mg/dL 0.5   ALK PHOS IU/L 78   ALT IU/L 32   AST IU/L 28   GLUCOSE mg/dL 140*     Troponin I Results       09/21/21     1848    Troponin I 0.11         Comment for Troponin I at 1848 on 09/21/21: Result requires test to be repeated on new specimen 4-6 hours after the original.          Microbiology Data personally reviewed:  Microbiology Results     Procedure Component Value Units Date/Time    SARS-CoV-2 (COVID-19), PCR Nasopharynx [[id number]]  (Normal) Collected: 09/21/21 1955    Specimen: Nasopharyngeal Swab from Nasopharynx Updated: 09/21/21 2109    Narrative:      The following orders were created for panel order SARS-CoV-2 (COVID-19), PCR Nasopharynx.  Procedure                               Abnormality         Status                     ---------                               -----------         ------                     SARS-CoV-2 (COVID-19), P...[[id number]]  Normal              Final  result                 Please view results for these tests on the individual orders.    SARS-CoV-2 (COVID-19), PCR Nasopharynx [[id number]]  (Normal) Collected: 09/21/21 1955    Specimen: Nasopharyngeal Swab from Nasopharynx Updated: 09/21/21 2109     SARS-CoV-2 (COVID-19) Negative          Imaging personally reviewed(does not include unread studies):  X-RAY CHEST 1 VIEW    Result Date: 9/21/2021  IMPRESSION: No active disease in the chest. COMMENT: The current portable study the chest was compared to that dated 5/24/2006 The lungs are well aerated and clear.  The cardiomediastinal silhouette is normal in size and configuration.  The costophrenic sulci and bony thorax are intact.      ECG/Telemetry  I have independently reviewed the ECG. No significant findings.    ASSESSMENT AND PLAN           Shortness of breath  Assessment & Plan  Presented with SOB and nonproductive cough  for 1 week.   Recently admitted 9/13 at [facility] for acute PE and anemia. Pt eloped before receiving any medications.   CT PE at [facility] notable for acute PE in left upper lobe branch and DVT studies notable for right posterior tibial, peroneal, popliteal and dstal thigh femoral vein.   IVC filter placed 9/16.   CT PE on admission (9/22) showed small B/L pleural effusions (R>L), no evidence of PE or RV strain.  TTE at [facility] notable for EF 25-30%  SOB likely to be secondary to acute PE vs. CHF exacerbation (given EF of 25-30%).   Received 1x lasix in ED  BNP 1476 Trop 0.11    -Started on heparin gtt  -Cardiology consult  -Furosemide 20mg   -Trend trop to peak    CHF (congestive heart failure) (CMS/[facility])  Assessment & Plan  TTE on prior admission at [facility] - EF 25-30%.   Seen by cardiology who recommendedadding Toprol XL to regimen with low dose diuretic. Patient did not start any meds due to eloping.   Patient does not appear significantly volume overloaded on exam.   Endorses SOB at rest and with exertion and orthopnea.   BNP 1476    -Gentle diuresis with  furosemide 20mg  -Cardiology consult, appreciate input  -Will need outpatient follow up with cardiology  -Will start on metoprolol 12.5 and entresto 24-26mg        History of pulmonary embolism  Assessment & Plan  Admitted at [facility] on 9/13 for acute PE/DVT  CT PE notable for acute PE in let upper lobe branch and DVT studies notable for right posterior tibial, peroneal, popliteal and dstal thigh femoral vein.  CT PE in [facility] ED shows not signs of emobli or RV strain  Pt was suppose to be discharged on apixaban however eloped before having any meds filled.   IVC filter placed 9/16    -Continue heparin gtt with plans to transition to apixaban  -Will need outpatient pulmonary follow-up      Anemia  Assessment & Plan  On admission to [facility] noted to have iron deficiency with reports of tarry stools and endorsed daily NSAID use. Hgb 6.4 requiring 2U PRBC while admitted. Scheduled for EGD/[facility] however eloped prior to procedure.   CT A/P at [facility] notable for peculiar appearance of fluid in perihepatic space. Consider ascites vs. Small peritoneal hemorrhage although typical hemorrhage would demonstrate attenuation that is higher than that of fluid at the time of imaging  GI consulted for possible EGD/[facility] 9/17 but he eloped prior to being taken down for procedure.  Hemoglobin currently stable at 8.9. No signs of active bleeding    -CBC daily  -Transfuse Hgb >7  -If patient has acute drops in Hgb, requiring transfusion consider GI consult for EGD/[facility] as inpatient.   -EGD/[facility] as outpatient if CBC remains stable          Type 2 diabetes mellitus, with long-term current use of insulin (CMS/[facility])  Assessment & Plan  A1C 7.4 on 9/15/21  Patient notes that he takes between 20-30 units glargine nightly and metformin 1000mg daily.     -Glargine 20 units nightly  -SSI  -POC glucose checks  -Diabetic diet 1500 carbohydrate       VTE Assessment: [place]    VTE Prophylaxis: Current anticoagulants:  heparin (porcine) bolus from bag 3,650-7,250  Units, intravenous, q6h PRN  heparin infusion in 0.45%  units/mL, intravenous, Titrated      Palliative Care Screen:    Code Status: Full Code  Estimated discharge date:      ATTENDING DOCUMENTATION  ALSO SEE ATTENDING ATTESTATION SECTION OF NOTE

## 2021-09-22 NOTE — ASSESSMENT & PLAN NOTE
TTE on prior admission at [facility] - EF 25-30%. Seen by cardiology that admission who recommended adding Toprol XL to regimen with low dose diuretic. Patient left the hospital before he could be started on meds.   Patient now presenting with SOB at rest and with exertion and orthopnea, does not appear significantly volume overloaded on exam.   BNP 1476    - Continue diuresis  - Continue metoprolol 12.5 qD, entresto 24-26mg BID, and spironolactone 25mg qD  - Monitor BP, consider incr Entresto or change metoprolol to carvedilol for improved BP control  - Cardiology consulted, appreciate input  - Outpatient follow up with cardiology

## 2021-09-22 NOTE — ASSESSMENT & PLAN NOTE
A1C 7.4 on 9/15/21  Patient reports home regimen as 20-30 units glargine and metformin 1000mg daily. Intermittently takes insulin as needed.     - Hold glargine as AM BG 80s after dose on admission, continue to monitor  - Continue SSI and POCT glucose  - Diabetic diet

## 2021-09-23 LAB
ANION GAP SERPL CALC-SCNC: 12 MEQ/L (ref 3–15)
APTT PPP: 84 SEC (ref 23–35)
APTT PPP: 94 SEC (ref 23–35)
BASOPHILS # BLD: 0.09 K/UL (ref 0.01–0.1)
BASOPHILS NFR BLD: 1.3 %
BUN SERPL-MCNC: 10 MG/DL (ref 8–20)
CALCIUM SERPL-MCNC: 9.4 MG/DL (ref 8.9–10.3)
CHLORIDE SERPL-SCNC: 108 MEQ/L (ref 98–109)
CO2 SERPL-SCNC: 23 MEQ/L (ref 22–32)
CREAT SERPL-MCNC: 1.1 MG/DL (ref 0.8–1.3)
DIFFERENTIAL METHOD BLD: ABNORMAL
EOSINOPHIL # BLD: 0.21 K/UL (ref 0.04–0.54)
EOSINOPHIL NFR BLD: 3 %
ERYTHROCYTE [DISTWIDTH] IN BLOOD BY AUTOMATED COUNT: 24.6 % (ref 11.6–14.4)
ERYTHROCYTE [DISTWIDTH] IN BLOOD BY AUTOMATED COUNT: 24.8 % (ref 11.6–14.4)
GFR SERPL CREATININE-BSD FRML MDRD: >60 ML/MIN/1.73M*2
GLUCOSE BLD-MCNC: 114 MG/DL (ref 70–99)
GLUCOSE BLD-MCNC: 125 MG/DL (ref 70–99)
GLUCOSE BLD-MCNC: 133 MG/DL (ref 70–99)
GLUCOSE BLD-MCNC: 85 MG/DL (ref 70–99)
GLUCOSE SERPL-MCNC: 77 MG/DL (ref 70–99)
HCT VFR BLDCO AUTO: 36.6 % (ref 40.1–51)
HCT VFR BLDCO AUTO: 36.8 % (ref 40.1–51)
HGB BLD-MCNC: 10.1 G/DL (ref 13.7–17.5)
HGB BLD-MCNC: 10.3 G/DL (ref 13.7–17.5)
HIV 1+2 AB+HIV1 P24 AG SERPL QL IA: NONREACTIVE
IMM GRANULOCYTES # BLD AUTO: 0.03 K/UL (ref 0–0.08)
IMM GRANULOCYTES NFR BLD AUTO: 0.4 %
LYMPHOCYTES # BLD: 1.8 K/UL (ref 1.2–3.5)
LYMPHOCYTES NFR BLD: 25.3 %
MAGNESIUM SERPL-MCNC: 1.9 MG/DL (ref 1.8–2.5)
MCH RBC QN AUTO: 21.1 PG (ref 28–33.2)
MCH RBC QN AUTO: 21.5 PG (ref 28–33.2)
MCHC RBC AUTO-ENTMCNC: 27.6 G/DL (ref 32.2–36.5)
MCHC RBC AUTO-ENTMCNC: 28 G/DL (ref 32.2–36.5)
MCV RBC AUTO: 75.6 FL (ref 83–98)
MCV RBC AUTO: 78 FL (ref 83–98)
MONOCYTES # BLD: 0.51 K/UL (ref 0.3–1)
MONOCYTES NFR BLD: 7.2 %
NEUTROPHILS # BLD: 4.47 K/UL (ref 1.7–7)
NEUTS SEG NFR BLD: 62.8 %
NRBC BLD-RTO: 0 %
PDW BLD AUTO: 10 FL (ref 9.4–12.4)
PDW BLD AUTO: 10.2 FL (ref 9.4–12.4)
PLATELET # BLD AUTO: 257 K/UL (ref 150–350)
PLATELET # BLD AUTO: 272 K/UL (ref 150–350)
POCT TEST: ABNORMAL
POCT TEST: NORMAL
POTASSIUM SERPL-SCNC: 3.7 MEQ/L (ref 3.6–5.1)
RBC # BLD AUTO: 4.69 M/UL (ref 4.5–5.8)
RBC # BLD AUTO: 4.87 M/UL (ref 4.5–5.8)
SODIUM SERPL-SCNC: 143 MEQ/L (ref 136–144)
WBC # BLD AUTO: 7.11 K/UL (ref 3.8–10.5)
WBC # BLD AUTO: 7.4 K/UL (ref 3.8–10.5)

## 2021-09-23 PROCEDURE — 85730 THROMBOPLASTIN TIME PARTIAL: CPT | Performed by: HOSPITALIST

## 2021-09-23 PROCEDURE — 21400000 HC ROOM AND CARE CCU/INTERMEDIATE

## 2021-09-23 PROCEDURE — 99232 SBSQ HOSP IP/OBS MODERATE 35: CPT | Performed by: INTERNAL MEDICINE

## 2021-09-23 PROCEDURE — 25800000 HC PHARMACY IV SOLUTIONS: Performed by: STUDENT IN AN ORGANIZED HEALTH CARE EDUCATION/TRAINING PROGRAM

## 2021-09-23 PROCEDURE — 80048 BASIC METABOLIC PNL TOTAL CA: CPT | Performed by: STUDENT IN AN ORGANIZED HEALTH CARE EDUCATION/TRAINING PROGRAM

## 2021-09-23 PROCEDURE — 63700000 HC SELF-ADMINISTRABLE DRUG: Performed by: STUDENT IN AN ORGANIZED HEALTH CARE EDUCATION/TRAINING PROGRAM

## 2021-09-23 PROCEDURE — 85025 COMPLETE CBC W/AUTO DIFF WBC: CPT | Performed by: STUDENT IN AN ORGANIZED HEALTH CARE EDUCATION/TRAINING PROGRAM

## 2021-09-23 PROCEDURE — 63600000 HC DRUGS/DETAIL CODE: Performed by: STUDENT IN AN ORGANIZED HEALTH CARE EDUCATION/TRAINING PROGRAM

## 2021-09-23 PROCEDURE — 36415 COLL VENOUS BLD VENIPUNCTURE: CPT | Performed by: HOSPITALIST

## 2021-09-23 PROCEDURE — 99233 SBSQ HOSP IP/OBS HIGH 50: CPT | Performed by: HOSPITALIST

## 2021-09-23 PROCEDURE — 83735 ASSAY OF MAGNESIUM: CPT | Performed by: STUDENT IN AN ORGANIZED HEALTH CARE EDUCATION/TRAINING PROGRAM

## 2021-09-23 PROCEDURE — 85027 COMPLETE CBC AUTOMATED: CPT | Performed by: STUDENT IN AN ORGANIZED HEALTH CARE EDUCATION/TRAINING PROGRAM

## 2021-09-23 RX ORDER — SPIRONOLACTONE 25 MG/1
25 TABLET ORAL DAILY
Status: DISCONTINUED | OUTPATIENT
Start: 2021-09-23 | End: 2021-09-24 | Stop reason: HOSPADM

## 2021-09-23 RX ORDER — FUROSEMIDE 10 MG/ML
20 INJECTION INTRAMUSCULAR; INTRAVENOUS DAILY
Status: DISCONTINUED | OUTPATIENT
Start: 2021-09-24 | End: 2021-09-24 | Stop reason: HOSPADM

## 2021-09-23 RX ORDER — POTASSIUM CHLORIDE 1.5 G/1.58G
40 POWDER, FOR SOLUTION ORAL ONCE
Status: COMPLETED | OUTPATIENT
Start: 2021-09-23 | End: 2021-09-23

## 2021-09-23 RX ORDER — POTASSIUM CHLORIDE 750 MG/1
40 TABLET, FILM COATED, EXTENDED RELEASE ORAL ONCE
Status: DISCONTINUED | OUTPATIENT
Start: 2021-09-23 | End: 2021-09-23

## 2021-09-23 RX ADMIN — SACUBITRIL AND VALSARTAN 1 TABLET: 24; 26 TABLET, FILM COATED ORAL at 09:29

## 2021-09-23 RX ADMIN — POTASSIUM CHLORIDE 40 MEQ: 1.5 FOR SOLUTION ORAL at 05:43

## 2021-09-23 RX ADMIN — GUAIFENESIN 200 MG: 100 SOLUTION ORAL at 17:20

## 2021-09-23 RX ADMIN — MAGNESIUM SULFATE 1 G: 1 INJECTION INTRAVENOUS at 09:37

## 2021-09-23 RX ADMIN — BENZONATATE 200 MG: 100 CAPSULE ORAL at 17:21

## 2021-09-23 RX ADMIN — ATORVASTATIN CALCIUM 40 MG: 40 TABLET, FILM COATED ORAL at 17:19

## 2021-09-23 RX ADMIN — RIVAROXABAN 15 MG: 15 TABLET, FILM COATED ORAL at 17:19

## 2021-09-23 RX ADMIN — GABAPENTIN 300 MG: 300 CAPSULE ORAL at 13:51

## 2021-09-23 RX ADMIN — SACUBITRIL AND VALSARTAN 1 TABLET: 24; 26 TABLET, FILM COATED ORAL at 20:30

## 2021-09-23 RX ADMIN — SODIUM CHLORIDE 125 MG: 9 INJECTION, SOLUTION INTRAVENOUS at 13:49

## 2021-09-23 RX ADMIN — GABAPENTIN 300 MG: 300 CAPSULE ORAL at 09:29

## 2021-09-23 RX ADMIN — PANTOPRAZOLE SODIUM 20 MG: 20 TABLET, DELAYED RELEASE ORAL at 09:29

## 2021-09-23 RX ADMIN — METOPROLOL SUCCINATE 12.5 MG: 25 TABLET, EXTENDED RELEASE ORAL at 09:29

## 2021-09-23 RX ADMIN — GABAPENTIN 300 MG: 300 CAPSULE ORAL at 20:30

## 2021-09-23 RX ADMIN — BENZONATATE 200 MG: 100 CAPSULE ORAL at 09:45

## 2021-09-23 RX ADMIN — FUROSEMIDE 20 MG: 10 INJECTION, SOLUTION INTRAMUSCULAR; INTRAVENOUS at 09:29

## 2021-09-23 RX ADMIN — SPIRONOLACTONE 25 MG: 25 TABLET ORAL at 13:49

## 2021-09-23 NOTE — STUDENT
Medical Student Note: For educational purposes only.  Do not use for coding or billing.     Medical Student Daily Progress Note    Subjective     Interval History: complains of dry cough impairing sleep, counseled pt on utilizing PRN cough suppressants.       Objective     Vital signs in last 24 hours:  Temp:  [36.2 °C (97.2 °F)-36.7 °C (98.1 °F)] 36.4 °C (97.6 °F)  Heart Rate:  [72-79] 79  Resp:  [18] 18  BP: (131-168)/(71-95) 131/95      Intake/Output Summary (Last 24 hours) at 9/23/2021 1552  Last data filed at 9/23/2021 1200  Gross per 24 hour   Intake 100 ml   Output 5700 ml   Net -5600 ml     Intake/Output this shift:  I/O this shift:  In: 100 [IV Piggyback:100]  Out: 1100 [Urine:1100]    Physical Exam:  General appearance: alert, appears stated age and cooperative  Lungs: clear to auscultation bilaterally  Heart: regular rate and rhythm, S1, S2 normal, no murmur, click, rub or gallop  Abdomen: soft, non-tender; bowel sounds normal; no masses, no organomegaly  Extremities: edema R LE 2+ and swelling in calf, L LE 2+ to midcalf    Labs  I have reviewed the patient's labs.  Significant abnormals are HgB up to 10.1 from 8.5. Mg 1.9 down from 2.0.    Results from last 7 days   Lab Units 09/23/21  1407   WBC K/uL 7.40   HEMOGLOBIN g/dL 10.3*   HEMATOCRIT % 36.8*   PLATELETS K/uL 272       Results from last 7 days   Lab Units 09/23/21  0442   SODIUM mEQ/L 143   POTASSIUM mEQ/L 3.7   CHLORIDE mEQ/L 108   CO2 mEQ/L 23   BUN mg/dL 10   CREATININE mg/dL 1.1   GLUCOSE mg/dL 77   CALCIUM mg/dL 9.4         Imaging  Not applicable    VTE Assessment: IV hep  Diet: diabetic    Assessment/Plan     #1 SOB and cough, Hx of PE  1 week duration, since discharge from [facility]. Possible etiologies include PE vs. CHF exacerbation vs. ACS vs. Severe anemia. PE r/o from CT chest showing only small bilateral pleural effusions, no RV strain. CHF exacerbation possible due to some volume overload per exam and low EF (25-30% 9/2021). ACS  still possible pending ischemic workup. Severe anemia less likely give no active bleeding source and HgB stable at 8.5. Pt stable ORA.  - O/p Pulm f/u for PE hx     #2 CHF  EF 25-30% per 9/2021 ECHO.  - Further cards recs appreciated   - Transition to Xarelto this evening  - GDMT, metoprolol 12.5 mg, entresto 24-26 mg, start spironolactone 25 mg today  - c/w Lasix 20 mg  - Trop peaked at .06, no further trending needed  - o/p f/u cards     #3 Anemia  Hx of dark stools and reported daily NSAID use at [facility] s/p 2 units pRBCs eloped prior to EGD/colonoscopy. HgB stable at 10.1 (from 8.5).  - trend CDC  - if HgB < 7, transfuse and consult GI for IP EGD/colonoscopy  - otherwise o/p EGD/colonoscopy     #4 DM II  A1C 7.4 9/2021 prescribed home 20-30 nightly glargine and metformin 1000 mg.  - no current insulin reqs  - poc glc checks  - diabetic diet    Expected Discharge Date:   9/25/2021

## 2021-09-23 NOTE — DISCHARGE INSTR - OTHER ORDERS
[facility] is offering Free smoking cessation classes virtually through [employer].  Please register by calling [phone].

## 2021-09-23 NOTE — PROGRESS NOTES
CARDIOLOGY INPATIENT FOLLOW-UP NOTE     Patient: Enrike Napoles YOB: 1960   MRN: [id number] Date of Admission: 9/21/2021   Length of Stay: 1      ASSESSMENT AND RECOMMENDATIONS     61 y.o. male with history of recently diagnosed DVT/PE s/p IVC filter, HFrEF (25-30%), DM2, HTN, prostate CA s/p prostatectomy who presents to [facility] ED 9/21 with SOB found to have small bilateral pleural effusions.     # Acute decompensated HF (EF 25-30%),   - etiology of HFrEF unclear -most likely ICM however no evaluation yet, possibly also uncontrolled HTN, no hx of tacchyarrhythmia. Patient should have ischemic eval and basic lab work completed.  -etiology for exacerbation: medication non-compliance/non-optimization  - NYHA Class: III   - Volume status: warm and wet -improved  - Cardiologist: Dr. Atkinson (not yet seen)  - BNP: 1476  - trop: 0.011- plateaued  - Cr 1.2 on admission (stable from [facility])  - Dry weight = unknown - 231 lb at [place] when euvolemic per notes (admission weight =230 lb)   - I/O in past 24 hours: 2.7 L UO    - TTE (last 9/13/21): showed EF25-30%, global hypokinesis minimal segmental variation, moderate MR, PA systolic pressure moderately elevated (50 mmHG)  - CXR on admission = no active disease  -CT Chest- No PE, small bilateral pleural effusions right greater than left.  Mild coronary artery calcification  - EKG on admission = NSR @86BPM, NC interval not prolonged, QRS narrow, QTc 481. Left axis deviation, Q wave in inferior leads, No acute ST-T wave changes.  -home med: entresto 24-26 BD  - s/p IV lasix 40 mg in ED (lasix naiive)   - patient has high STOP-BANG score  PLAN  - Strict I/Os, Daily Weights   - Cardiac diet: 2 g Na, 2 L fluids, daily electrolytes replete for goal K > 4.0, Mg > 2.0   - Continuous telemetry 48 hrs: re-assess need for daily.   - strict ins and out, daily standing AM weights, fluid/sodium restriction  - limit use of NSAIDs, watch for ototoxicity on IV diuretics   -  "Supplemental O2 to maintain SpO2 >90%   - Admission meds:               - BB: would convert metoprolol to coreg for better afterload reduction - 3.125 BD  --continue Entresto 24-26 mg BD  - Diuretic: IV lasix 20 mg BD- continue with goal net negative 1.5-2L today  - not candidate for SGLT2 therapy given incontinence at baseline  - cardiomyopathy w/u: HIV, LISANDRA, UDS.  - should have anemia work up - noted to have melena at last admission. Consider GI consult vs outpatient workup. Would check iron studies and supplement IV while inpatient PRN  - patient should undergo ischemic eval- can be done as outpatient  - would consider sleep study - patient screens in high risk for YAMILETH. TTE also with elevated PASP.   -We will need repeat TTE once on maximal goal-directed medical therapy, to guide future discussions about primary prevention ICD placement  - At time of discharge: outpatient follow-up (has appt 10/7)    #Recent DVT/PE  - continue a/c  - will need outpatient followup for eventual IVC filter removal     #HTN  - continue BB and entresto as above- uptitrate as tolerated   - YAMILETH evaluation     #T2DM  - recent A1C 7.4  - cholesterol panel: total cholesterol 118, TG, HDL 37, LDL: 64  - insulin dependent  - management per primary team  - continue high-intensity statin, cholesterol panel reasonable.    Thank you for the opportunity to participate in this patient's care.  Please call with any questions or concerns. Please note that all recommendations are preliminary until attending attestation.     --  Radha Casillas MD  PGY-4 Fellow in Cardiovascular Medicine      INTERVAL/SUBJECTIVE     -patient feels much better today- notes improvement in SOB and LE edema.       PHYSICAL EXAMINATION     Vitals:   Visit Vitals  BP (!) 147/85 (BP Location: Right upper arm, Patient Position: Lying)   Pulse 74   Temp 36.4 °C (97.6 °F) (Oral)   Resp 18   Ht 1.88 m (6' 2\")   Wt 93.5 kg (206 lb 1.6 oz)   SpO2 94%   BMI 26.46 kg/m²     Temp: "  [36.2 °C (97.2 °F)-36.7 °C (98.1 °F)] 36.4 °C (97.6 °F)  Heart Rate:  [72-82] 74  Resp:  [18] 18  BP: (133-168)/(71-95) 147/85  I/O last 3 completed shifts:  In: -   Out: 7600 [Urine:7600]  Wt Readings from Last 3 Encounters:   09/23/21 93.5 kg (206 lb 1.6 oz)   05/25/18 107 kg (235 lb)     Constitutional:       General: He is not in acute distress.     Appearance: Normal appearance. He is normal weight. He is not toxic-appearing.   HENT:      Mouth/Throat:      Mouth: Mucous membranes are moist.   Neck:      Comments: JVD - approx 7 cm  Cardiovascular:      Rate and Rhythm: Normal rate and regular rhythm.      Heart sounds: Murmur heard.        Comments: Soft systolic murmur  Pulmonary:      Effort: Pulmonary effort is normal. No respiratory distress.      Breath sounds: No wheezing or rales.      Comments: Decreased breath sounds at bases bilaterally  Abdominal:      General: Abdomen is flat. Bowel sounds are normal. There is no distension.      Palpations: Abdomen is soft.   Musculoskeletal:         General: Tenderness present.      Cervical back: Neck supple.      Right lower leg: Edema present.      Left lower leg: No edema.      Comments: RLE tenderness   Skin:     General: Skin is warm and dry.   Neurological:      Mental Status: He is alert.     LABORATORY DATA     Results from last 7 days   Lab Units 09/23/21 0442 09/22/21 2121 09/22/21  0804 09/22/21  0610 09/21/21  1847 09/21/21  1847   SODIUM mEQ/L 143 144 141  --    < > 139   CHOLESTEROL mg/dL  --   --   --  118  --   --    CO2 mEQ/L 23 23 20*  --    < > 20*   BUN mg/dL 10 13 14  --    < > 15   CALCIUM mg/dL 9.4 9.7 8.9  --    < > 9.2   ALT IU/L  --   --   --   --   --  32   AST IU/L  --   --   --   --   --  28    < > = values in this interval not displayed.     Results from last 7 days   Lab Units 09/23/21 0442 09/22/21  0804 09/21/21  1847   WBC K/uL 7.11 6.52 6.82   PLATELETS K/uL 257 259 276     Results from last 7 days   Lab Units  09/23/21  0442 09/23/21  0006 09/22/21  1704 09/22/21  0156 09/21/21  1847   PTT sec 84* 94* 104*   < > 31   INR   --   --   --   --  1.1   PROTIME sec  --   --   --   --  14.0    < > = values in this interval not displayed.           No lab exists for component: CPK      Results from last 7 days   Lab Units 09/22/21  0610   CHOLESTEROL mg/dL 118   TRIGLYCERIDES mg/dL 85         Results from last 7 days   Lab Units 09/21/21  1847   BNP pg/mL 1,476*       ELECTROCARDIOGRAPHIC / CIED ASSESSMENTS     ECG   NSR @86BPM, SC interval not prolonged, QRS narrow, QTc 481. Left axis deviation, Q wave in inferior leads, No acute ST-T wave changes.      Telemetry   NSR     DIAGNOSTIC IMAGING / PROCEDURES      TTE: 9/13 ([place])  •  A complete transthoracic echocardiogram (including 2D, color flow   Doppler, spectral Doppler, M-mode and strain imaging) was performed using   the standard protocol. The study quality was adequate.   •  The left ventricle is mildly dilated. There is global hypokinesis with   minimal segmental variation. Severely decreased left ventricular ejection   fraction. The left ventricular ejection fraction by visual estimate is 25%   to 30%. The average global longitudinal peak systolic strain is reduced.   •  The right ventricle is normal in size. There is normal function of the   right ventricle.   •  The left atrium is mildly dilated.   •  The right atrium is mildly dilated.   •  There is moderate mitral regurgitation. There is no mitral stenosis.   •  The aortic valve is normal in structure and trileaflet. There is no   aortic stenosis. There is no aortic regurgitation.   •  The tricuspid valve is normal in structure. There is mild tricuspid   regurgitation. Pulmonary artery systolic pressure measures 50 mmHg.The   pulmonary artery systolic pressure is moderately elevated.   •  The aortic root is mildly enlarged. The proximal segment of the   ascending aorta is mildly enlarged.   •  Trivial pericardial  effusion present.   •  The inferior vena cava is dilated (diameter >21 mm) and decreases <50%   in size with inspiration, suggesting an elevated right atrial pressure of   15 mmHg (range 10-20 mm Hg).   •  There is no prior study available for comparison at this organization.       Cath: no prior      Stress: no prior     Radiologic Studies:  Chest X-Ray 9/21: No acute cardiopulmonary process   CTPE:    1. No evidence of pulmonary emboli, as questioned clinically.  2. Low lung volumes. Hypoventilatory changes with suspected air trapping. Please  see above discussion.  3. Small bilateral pleural effusions, right greater than left.  4. Cardiomegaly. Mild coronary artery calcifications.     CT Chest 9/21:  1. No evidence of pulmonary emboli, as questioned clinically.  2. Low lung volumes. Hypoventilatory changes with suspected air trapping. Please  see above discussion.  3. Small bilateral pleural effusions, right greater than left.  4. Cardiomegaly. Mild coronary artery calcifications.     CT Chest w/IV Contrast: (9/13)  1.  Acute segmental left upper lobe pulmonary embolus possibly with findings of chronic thromboembolic disease in the right lower lobe.  Study is limited by respiratory motion.   2.  Cardiomegaly with vascular congestion and bilateral pleural effusions.   3.  Bilateral gynecomastia.         Signed:      Radha Casillas MD, Fellow in Cardiovascular Medicine

## 2021-09-23 NOTE — CONSULTS
"Brief Nutrition Note    Recommendations      1. Will liberalize diet to NCS, [place] with 1500mL fluid restriction to provide patient with more options at meals    2. Reviewed diet restrictions with patient, patient not wanting restrictions due to reported weight loss    Clinical Course: Patient is a 61 y.o. male who was admitted on 9/21/2021 with a diagnosis of Elevated troponin [R77.8]  History of pulmonary embolus (PE) [Z86.711]  Anemia, unspecified type [D64.9]  Systolic congestive heart failure, unspecified HF chronicity (CMS/HCC) [I50.20]  Congestive heart failure, unspecified HF chronicity, unspecified heart failure type (CMS/HCC) [I50.9].     Past Medical History:   Diagnosis Date   • Hypertension    • Type 2 diabetes mellitus (CMS/HCC)      Past Surgical History:   Procedure Laterality Date   • PROSTATE SURGERY         Reason for Assessment  Reason For Assessment: physician consult     [facility] Nutrition Screen Tool  Has patient lost weight without trying?: 0-->No  If yes,how much weight has been lost?: 0-->Patient has not lost weight  Has patient been eating poorly due to decreased appetite?: 1-->Yes  [facility] Nutrition Screen Score: 1     Nutrition/Diet History  Appetite Prior to Admission: Good-50-75%    Physical Findings  Last Bowel Movement: 09/19/21     [id number] Physical Appearance  Last Bowel Movement: 09/19/21     Nutrition Order  Nutrition Order: meets nutritional requirements  Nutrition Order Comments: Consistent carbohydrate 2000, Cardiac, NCS, 1500mL fluid restriction     Anthropometrics  Height: 188 cm (6' 2\")           Current Weight  Weight Method: Standing scale  Weight: 93.5 kg (206 lb 1.6 oz)     Ideal Body Weight (IBW)  Ideal Body Weight (IBW) (kg): 87.66  % Ideal Body Weight: 119.02     Body Mass Index (BMI)  BMI (Calculated): 26.5     Labs/Procedures/Meds  Lab Results Reviewed: reviewed     [email or web address] Lab Results  Lab Results Reviewed: reviewed   Lab Results   Component Value Date    GLUCOSE 77 09/23/2021 " "   CALCIUM 9.4 09/23/2021     09/23/2021    K 3.7 09/23/2021    CO2 23 09/23/2021     09/23/2021    BUN 10 09/23/2021    CREATININE 1.1 09/23/2021           Medications  Pertinent Medications Reviewed: reviewed   • atorvastatin  40 mg oral Daily (6p)   • ferric gluconate (FERRLECIT) IVPB  125 mg intravenous Daily   • [START ON 9/24/2021] furosemide  20 mg intravenous Daily   • gabapentin  300 mg oral TID   • insulin aspart U-100  3-5 Units subcutaneous With meals & nightly   • metoprolol succinate XL  12.5 mg oral Daily   • pantoprazole  20 mg oral Daily   • sacubitriL-valsartan  1 tablet oral BID   • spironolactone  25 mg oral Daily           Skin: intact per chart    Clinical comments: Patient admitted with acute decompensated heart failure. Patient at visit eating lunch, states that \"doesn't know why they are restricting me when I lost 40#.\" Patient states that he lost 40# over past month, # per patient, noted per MST patient did not report weight loss, noted stated weight of 235# from 2018.     Goals: Weight maintenance, adequate oral intake  Monitor: Weight, labs, oral intake, skin    Recommendations: See above       Date: 09/23/21  Signature: ZOË Gordillo  "

## 2021-09-23 NOTE — PROGRESS NOTES
Internal Medicine  Daily Progress Note       SUBJECTIVE   This is a 61 y.o. year-old male admitted on 9/21/2021 with Elevated troponin [R77.8]  History of pulmonary embolus (PE) [Z86.711]  Anemia, unspecified type [D64.9]  Systolic congestive heart failure, unspecified HF chronicity (CMS/HCC) [I50.20]  Congestive heart failure, unspecified HF chronicity, unspecified heart failure type (CMS/HCC) [I50.9].    Interval History: No acute events overnight. Pt seen at bedside today, reports improvements to his breathing. Endorses continued cough. Denies any chest pain, N/V, headache, lightheadedness or dizziness.      OBJECTIVE   Vital signs in last 24 hours:  Temp:  [36.2 °C (97.2 °F)-36.7 °C (98.1 °F)] 36.4 °C (97.6 °F)  Heart Rate:  [72-82] 74  Resp:  [18] 18  BP: (133-168)/(71-95) 147/85  SpO2:  [94 %-98 %] 94 %  Oxygen Therapy: None (Room air)    Weight & I/Os:  Weights (last 5 days)     Date/Time Weight    09/23/21 0547 93.5 kg (206 lb 1.6 oz)    09/21/21 1841 104 kg (230 lb)          Intake/Output Summary (Last 24 hours) at 9/23/2021 0659  Last data filed at 9/23/2021 0400  24 Hour Net Input/Output from 7AM Yesterday   Intake --   Output 4900 ml   Net -4900 ml        PHYSICAL EXAMINATION   Physical Exam  Vitals reviewed.   Constitutional:       General: He is not in acute distress.  HENT:      Mouth/Throat:      Mouth: Mucous membranes are moist.   Eyes:      Conjunctiva/sclera: Conjunctivae normal.   Cardiovascular:      Rate and Rhythm: Normal rate and regular rhythm.      Pulses: Normal pulses.      Heart sounds: Normal heart sounds.   Pulmonary:      Effort: Pulmonary effort is normal.      Breath sounds: Normal breath sounds.   Abdominal:      Palpations: Abdomen is soft.      Tenderness: There is no abdominal tenderness.   Musculoskeletal:      Comments: Trace pitting edema, BLE   Skin:     General: Skin is warm and dry.   Neurological:      General: No focal deficit present.      Mental Status: He is  alert.          LINES, CATHETERS, DRAINS, AIRWAYS, & WOUNDS   Lines, drains, airways, & wounds:  Peripheral IV (Adult) 09/21/21 Left Wrist (Active)   Number of days: 2        LABS, IMAGING, & TELE   Labs:  I have reviewed the patient's labs to the time of note. No new clinical concern.    Results from last 7 days   Lab Units 09/23/21 0442 09/22/21  0804 09/21/21  1847   WBC K/uL 7.11 6.52 6.82   HEMOGLOBIN g/dL 10.1* 8.5* 8.9*   HEMATOCRIT % 36.6* 30.0* 31.6*   PLATELETS K/uL 257 259 276       Results from last 7 days   Lab Units 09/23/21 0442 09/22/21 2121 09/22/21 0804 09/21/21 1847 09/21/21  1847   SODIUM mEQ/L 143 144 141   < > 139   POTASSIUM mEQ/L 3.7 3.8 3.6   < > 4.0   CHLORIDE mEQ/L 108 109 110*   < > 109   CO2 mEQ/L 23 23 20*   < > 20*   BUN mg/dL 10 13 14   < > 15   CREATININE mg/dL 1.1 1.2 1.2   < > 1.2   CALCIUM mg/dL 9.4 9.7 8.9   < > 9.2   ALBUMIN g/dL  --   --   --   --  3.2*   BILIRUBIN TOTAL mg/dL  --   --   --   --  0.5   ALK PHOS IU/L  --   --   --   --  78   ALT IU/L  --   --   --   --  32   AST IU/L  --   --   --   --  28   GLUCOSE mg/dL 77 113* 82   < > 140*    < > = values in this interval not displayed.     Imaging personally reviewed (does not include unread studies):  X-RAY CHEST 1 VIEW    Result Date: 9/21/2021  IMPRESSION: No active disease in the chest. COMMENT: The current portable study the chest was compared to that dated 5/24/2006 The lungs are well aerated and clear.  The cardiomediastinal silhouette is normal in size and configuration.  The costophrenic sulci and bony thorax are intact.    CT CHEST PULMONARY EMBOLISM WITH IV CONTRAST    Result Date: 9/22/2021  IMPRESSION: 1. No evidence of pulmonary emboli, as questioned clinically. 2. Low lung volumes. Hypoventilatory changes with suspected air trapping. Please see above discussion. 3. Small bilateral pleural effusions, right greater than left. 4. Cardiomegaly. Mild coronary artery calcifications. Preliminary results issued  by Dr. Garnett at 2309 hours on 9/21/2021.    Telemetry/EKG:  I have independently reviewed the telemetry. No events for the last 24 hours.     ASSESSMENT & PLAN   Shortness of breath  Assessment & Plan  Presented with SOB and nonproductive cough  for 1 week.   Recently admitted 9/13 at [facility] for acute PE and anemia. IVC filter placed 9/16. Pt eloped before receiving any medications, not currently on anticoagulation at home.   CT PE at [place] notable for acute PE in left upper lobe branch and DVT studies notable for right posterior tibial, peroneal, popliteal and dstal thigh femoral vein. CT PE on admission (9/22) showed small B/L pleural effusions (R>L), no evidence of PE or RV strain.  TTE at [facility] notable for EF 25-30%  SOB likely to be secondary to CHF exacerbation.   BNP 1476   Trop 0.11 x3, downtrending since prior admission    - Continue heparin gtt, plan to transition to apixaban tonight if Hgb stable on PM labs  - Cardiology consulted, with thanks  - Continue diuresis, decrease furosemide to once daily given significant UOP    * CHF (congestive heart failure) (CMS/[facility])  Assessment & Plan  TTE on prior admission at [facility] - EF 25-30%. Seen by cardiology that admission who recommended adding Toprol XL to regimen with low dose diuretic. Patient left the hospital before he could be started on meds.   Patient now presenting with SOB at rest and with exertion and orthopnea, does not appear significantly volume overloaded on exam.   BNP 1476    - Continue diuresis  - Continue metoprolol 12.5 and entresto 24-26mg  - Start spironolactone 25mg daily  - Cardiology consulted, appreciate input  - Outpatient follow up with cardiology        Anemia  Assessment & Plan  On admission to [facility] noted to have iron deficiency with reports of tarry stools and endorsed daily NSAID use. Hgb 6.4 requiring 2U PRBC while admitted. GI consulted and pt was scheduled for EGD/[place] on 9/17 at [facility] however eloped prior to procedure.   CT A/P at  [place] notable for peculiar appearance of fluid in perihepatic space. Consider ascites vs. Small peritoneal hemorrhage although typical hemorrhage would demonstrate attenuation that is higher than that of fluid at the time of imaging  Hemoglobin currently stable at 8.9. No signs of active bleeding  Pt reports prior colonoscopy in Nov 2020 before prostatectomy as well as age-appropriate cancer screening at 49    - Continue IV iron, prior iron studies at [facility] consistent with EVAN  - Trend CBC daily  - Transfuse Hgb >7  - If patient has acute drops in Hgb requiring transfusion, consider GI consult for EGD/[place] as inpatient. EGD/[place] as outpatient if CBC remains stable          History of pulmonary embolism  Assessment & Plan  Admitted at [facility] on 9/13 for acute PE/DVT  CT PE notable for acute PE in let upper lobe branch and DVT studies notable for right posterior tibial, peroneal, popliteal and dstal thigh femoral vein.  [place] [facility] shows no signs of emobli or RV strain  Pt was suppose to be discharged on apixaban however eloped before having any meds filled.   IVC filter placed 9/16    - Continue heparin gtt with plans to transition to apixaban  - Will need outpatient pulmonary follow-up      Type 2 diabetes mellitus, with long-term current use of insulin (CMS/[facility])  Assessment & Plan  A1C 7.4 on 9/15/21  Patient reports home regimen as 20-30 units glargine and metformin 1000mg daily. Intermittently takes insulin as needed.     - Hold glargine as AM BG 80s after dose on admission, continue to monitor  - Continue SSI and POCT glucose  - Diabetic diet         VTE Assessment: [place]    VTE Prophylaxis: Current anticoagulants:  heparin (porcine) bolus from bag 3,650-7,250 Units, intravenous, q6h PRN  heparin infusion in 0.45%  units/mL, intravenous, Titrated      Code Status: Full Code  Estimated discharge date: 9/25/2021     ATTENDING DOCUMENTATION  ALSO SEE ATTENDING ATTESTATION SECTION OF NOTE

## 2021-09-23 NOTE — PLAN OF CARE
Problem: Adult Inpatient Plan of Care  Goal: Plan of Care Review  Outcome: Progressing  Flowsheets (Taken 9/23/2021 0949)  Progress: improving  Plan of Care Reviewed With: patient  Outcome Summary: Per review of patient chart, the pt is being admitted and treating for CHF. Anticipating d/c home without skill needs. Pt is declining HC.     Problem: Adult Inpatient Plan of Care  Goal: Readiness for Transition of Care  Intervention: Mutually Develop Transition Plan  Flowsheets (Taken 9/23/2021 0951)  Anticipated Discharge Disposition: home without assistance or services  Equipment Needed After Discharge: (Scale) --  Discharge Coordination/Progress:  • CM s/w pt to complete assessment and begin DCP  • Pt reports lives alone in an Apt on the first floor and has 15 ERIS  • Pt reports independent with all ADL's and use cane for ambulation  • Demographic and pharmacy  • Pt currently has no established PCP but CM will provide a new PCP list today  • Pt has no scale at home, unable to afford to buy so CM will provide a scale prior to d/c  • Pt has no hx of SNF and HC  • Pt reports fully vaccinated with Pfizer for Covid  • Will continue to follow until d/c completed  Assistive Device/Animal Currently Used at Home: cane, straight  Anticipated Changes Related to Illness: none  Transportation Concerns: car, none  Current Discharge Risk: lives alone  Readmission Within the Last 30 Days: no previous admission in last 30 days  Patient/Family Anticipated Services at Transition: none  Patient/Family Anticipates Transition to: home  Transportation Anticipated: (Dougie Napoles (Son) [phone]) family or friend will provide  Concerns to be Addressed:  • denies needs/concerns at this time  • adjustment to diagnosis/illness  • patient refuses services  • care coordination/care conferences  Patient's Choice of Community Agency(s): Pt is declining HC  Offered/Gave Vendor List: no    1336: Per info in medical rounds today,tentative d/c on  weekend. New pt accepting PCP list and scale given to the patient.

## 2021-09-24 VITALS
HEIGHT: 74 IN | TEMPERATURE: 98.1 F | WEIGHT: 204.6 LBS | HEART RATE: 78 BPM | BODY MASS INDEX: 26.26 KG/M2 | SYSTOLIC BLOOD PRESSURE: 140 MMHG | RESPIRATION RATE: 18 BRPM | OXYGEN SATURATION: 99 % | DIASTOLIC BLOOD PRESSURE: 79 MMHG

## 2021-09-24 PROBLEM — R06.02 SHORTNESS OF BREATH: Status: RESOLVED | Noted: 2021-09-22 | Resolved: 2021-09-24

## 2021-09-24 PROBLEM — R79.89 ELEVATED TROPONIN: Status: RESOLVED | Noted: 2021-09-22 | Resolved: 2021-09-24

## 2021-09-24 LAB
ANION GAP SERPL CALC-SCNC: 10 MEQ/L (ref 3–15)
APTT PPP: 45 SEC (ref 23–35)
BASOPHILS # BLD: 0.06 K/UL (ref 0.01–0.1)
BASOPHILS NFR BLD: 0.9 %
BUN SERPL-MCNC: 13 MG/DL (ref 8–20)
CALCIUM SERPL-MCNC: 9.3 MG/DL (ref 8.9–10.3)
CHLORIDE SERPL-SCNC: 108 MEQ/L (ref 98–109)
CO2 SERPL-SCNC: 23 MEQ/L (ref 22–32)
CREAT SERPL-MCNC: 1.2 MG/DL (ref 0.8–1.3)
DIFFERENTIAL METHOD BLD: ABNORMAL
EOSINOPHIL # BLD: 0.23 K/UL (ref 0.04–0.54)
EOSINOPHIL NFR BLD: 3.4 %
ERYTHROCYTE [DISTWIDTH] IN BLOOD BY AUTOMATED COUNT: 24.6 % (ref 11.6–14.4)
GFR SERPL CREATININE-BSD FRML MDRD: >60 ML/MIN/1.73M*2
GLUCOSE BLD-MCNC: 115 MG/DL (ref 70–99)
GLUCOSE BLD-MCNC: 99 MG/DL (ref 70–99)
GLUCOSE SERPL-MCNC: 87 MG/DL (ref 70–99)
HCT VFR BLDCO AUTO: 37.6 % (ref 40.1–51)
HGB BLD-MCNC: 10.6 G/DL (ref 13.7–17.5)
IMM GRANULOCYTES # BLD AUTO: 0.02 K/UL (ref 0–0.08)
IMM GRANULOCYTES NFR BLD AUTO: 0.3 %
LYMPHOCYTES # BLD: 1.6 K/UL (ref 1.2–3.5)
LYMPHOCYTES NFR BLD: 23.6 %
MAGNESIUM SERPL-MCNC: 2.1 MG/DL (ref 1.8–2.5)
MCH RBC QN AUTO: 21.5 PG (ref 28–33.2)
MCHC RBC AUTO-ENTMCNC: 28.2 G/DL (ref 32.2–36.5)
MCV RBC AUTO: 76.1 FL (ref 83–98)
MONOCYTES # BLD: 0.59 K/UL (ref 0.3–1)
MONOCYTES NFR BLD: 8.7 %
NEUTROPHILS # BLD: 4.29 K/UL (ref 1.7–7)
NEUTS SEG NFR BLD: 63.1 %
NRBC BLD-RTO: 0 %
PDW BLD AUTO: 10 FL (ref 9.4–12.4)
PLATELET # BLD AUTO: 258 K/UL (ref 150–350)
POCT TEST: ABNORMAL
POCT TEST: NORMAL
POTASSIUM SERPL-SCNC: 3.9 MEQ/L (ref 3.6–5.1)
RBC # BLD AUTO: 4.94 M/UL (ref 4.5–5.8)
SODIUM SERPL-SCNC: 141 MEQ/L (ref 136–144)
WBC # BLD AUTO: 6.79 K/UL (ref 3.8–10.5)

## 2021-09-24 PROCEDURE — 99233 SBSQ HOSP IP/OBS HIGH 50: CPT | Performed by: INTERNAL MEDICINE

## 2021-09-24 PROCEDURE — 85730 THROMBOPLASTIN TIME PARTIAL: CPT | Performed by: HOSPITALIST

## 2021-09-24 PROCEDURE — 63700000 HC SELF-ADMINISTRABLE DRUG: Performed by: STUDENT IN AN ORGANIZED HEALTH CARE EDUCATION/TRAINING PROGRAM

## 2021-09-24 PROCEDURE — 80048 BASIC METABOLIC PNL TOTAL CA: CPT | Performed by: STUDENT IN AN ORGANIZED HEALTH CARE EDUCATION/TRAINING PROGRAM

## 2021-09-24 PROCEDURE — 63600000 HC DRUGS/DETAIL CODE: Performed by: STUDENT IN AN ORGANIZED HEALTH CARE EDUCATION/TRAINING PROGRAM

## 2021-09-24 PROCEDURE — 25800000 HC PHARMACY IV SOLUTIONS: Performed by: STUDENT IN AN ORGANIZED HEALTH CARE EDUCATION/TRAINING PROGRAM

## 2021-09-24 PROCEDURE — 83735 ASSAY OF MAGNESIUM: CPT | Performed by: STUDENT IN AN ORGANIZED HEALTH CARE EDUCATION/TRAINING PROGRAM

## 2021-09-24 PROCEDURE — 36415 COLL VENOUS BLD VENIPUNCTURE: CPT | Performed by: STUDENT IN AN ORGANIZED HEALTH CARE EDUCATION/TRAINING PROGRAM

## 2021-09-24 PROCEDURE — 99239 HOSP IP/OBS DSCHRG MGMT >30: CPT | Performed by: HOSPITALIST

## 2021-09-24 PROCEDURE — 85025 COMPLETE CBC W/AUTO DIFF WBC: CPT | Performed by: STUDENT IN AN ORGANIZED HEALTH CARE EDUCATION/TRAINING PROGRAM

## 2021-09-24 PROCEDURE — 200200 PR NO CHARGE: Performed by: HOSPITALIST

## 2021-09-24 RX ORDER — GABAPENTIN 300 MG/1
300 CAPSULE ORAL 3 TIMES DAILY
Qty: 90 CAPSULE | Refills: 0 | Status: SHIPPED | OUTPATIENT
Start: 2021-09-24 | End: 2021-10-24

## 2021-09-24 RX ORDER — TORSEMIDE 20 MG/1
40 TABLET ORAL DAILY
Qty: 60 TABLET | Refills: 0 | Status: SHIPPED | OUTPATIENT
Start: 2021-09-24 | End: 2021-10-24

## 2021-09-24 RX ORDER — LANOLIN ALCOHOL/MO/W.PET/CERES
400 CREAM (GRAM) TOPICAL DAILY
Qty: 30 TABLET | Refills: 0 | Status: SHIPPED | OUTPATIENT
Start: 2021-09-24 | End: 2021-10-24

## 2021-09-24 RX ORDER — SACUBITRIL AND VALSARTAN 24; 26 MG/1; MG/1
2 TABLET, FILM COATED ORAL 2 TIMES DAILY
Status: DISCONTINUED | OUTPATIENT
Start: 2021-09-24 | End: 2021-09-24 | Stop reason: HOSPADM

## 2021-09-24 RX ORDER — METOPROLOL SUCCINATE 25 MG/1
12.5 TABLET, EXTENDED RELEASE ORAL DAILY
Qty: 15 TABLET | Refills: 0 | Status: SHIPPED | OUTPATIENT
Start: 2021-09-25 | End: 2021-10-25

## 2021-09-24 RX ORDER — GUAIFENESIN 100 MG/5ML
200 SOLUTION ORAL EVERY 4 HOURS PRN
Qty: 120 ML | Refills: 0 | Status: SHIPPED | OUTPATIENT
Start: 2021-09-24 | End: 2021-10-04

## 2021-09-24 RX ORDER — SACUBITRIL AND VALSARTAN 24; 26 MG/1; MG/1
2 TABLET, FILM COATED ORAL 2 TIMES DAILY
Qty: 120 TABLET | Refills: 0 | Status: SHIPPED | OUTPATIENT
Start: 2021-09-24 | End: 2021-09-24 | Stop reason: HOSPADM

## 2021-09-24 RX ORDER — METFORMIN HYDROCHLORIDE 500 MG/1
500 TABLET ORAL 2 TIMES DAILY WITH MEALS
Qty: 60 TABLET | Refills: 0 | Status: SHIPPED | OUTPATIENT
Start: 2021-09-24 | End: 2021-10-24

## 2021-09-24 RX ORDER — PANTOPRAZOLE SODIUM 20 MG/1
20 TABLET, DELAYED RELEASE ORAL DAILY
Qty: 30 TABLET | Refills: 0 | Status: SHIPPED | OUTPATIENT
Start: 2021-09-25 | End: 2021-10-25

## 2021-09-24 RX ORDER — POTASSIUM CHLORIDE 750 MG/1
20 TABLET, EXTENDED RELEASE ORAL DAILY
Qty: 60 TABLET | Refills: 0 | Status: SHIPPED | OUTPATIENT
Start: 2021-09-24 | End: 2021-10-24

## 2021-09-24 RX ORDER — ATORVASTATIN CALCIUM 40 MG/1
40 TABLET, FILM COATED ORAL
Qty: 30 TABLET | Refills: 0 | Status: SHIPPED | OUTPATIENT
Start: 2021-09-24 | End: 2021-10-24

## 2021-09-24 RX ORDER — POTASSIUM CHLORIDE 750 MG/1
20 TABLET, FILM COATED, EXTENDED RELEASE ORAL ONCE
Status: COMPLETED | OUTPATIENT
Start: 2021-09-24 | End: 2021-09-24

## 2021-09-24 RX ORDER — FERROUS SULFATE 325(65) MG
325 TABLET ORAL EVERY OTHER DAY
Qty: 15 TABLET | Refills: 0 | Status: SHIPPED | OUTPATIENT
Start: 2021-09-24 | End: 2021-10-24

## 2021-09-24 RX ORDER — SPIRONOLACTONE 25 MG/1
25 TABLET ORAL DAILY
Qty: 30 TABLET | Refills: 0 | Status: SHIPPED | OUTPATIENT
Start: 2021-09-25 | End: 2021-10-25

## 2021-09-24 RX ORDER — SACUBITRIL AND VALSARTAN 49; 51 MG/1; MG/1
1 TABLET, FILM COATED ORAL 2 TIMES DAILY
Qty: 60 TABLET | Refills: 0 | Status: SHIPPED | OUTPATIENT
Start: 2021-09-24 | End: 2021-10-24

## 2021-09-24 RX ORDER — SACUBITRIL AND VALSARTAN 24; 26 MG/1; MG/1
1 TABLET, FILM COATED ORAL ONCE
Status: COMPLETED | OUTPATIENT
Start: 2021-09-24 | End: 2021-09-24

## 2021-09-24 RX ADMIN — SPIRONOLACTONE 25 MG: 25 TABLET ORAL at 09:21

## 2021-09-24 RX ADMIN — SODIUM CHLORIDE 125 MG: 9 INJECTION, SOLUTION INTRAVENOUS at 09:21

## 2021-09-24 RX ADMIN — SACUBITRIL AND VALSARTAN 1 TABLET: 24; 26 TABLET, FILM COATED ORAL at 09:21

## 2021-09-24 RX ADMIN — SACUBITRIL AND VALSARTAN 1 TABLET: 24; 26 TABLET, FILM COATED ORAL at 12:05

## 2021-09-24 RX ADMIN — RIVAROXABAN 15 MG: 15 TABLET, FILM COATED ORAL at 09:00

## 2021-09-24 RX ADMIN — GABAPENTIN 300 MG: 300 CAPSULE ORAL at 09:22

## 2021-09-24 RX ADMIN — GABAPENTIN 300 MG: 300 CAPSULE ORAL at 14:15

## 2021-09-24 RX ADMIN — METOPROLOL SUCCINATE 12.5 MG: 25 TABLET, EXTENDED RELEASE ORAL at 09:22

## 2021-09-24 RX ADMIN — BENZONATATE 200 MG: 100 CAPSULE ORAL at 10:15

## 2021-09-24 RX ADMIN — POTASSIUM CHLORIDE 20 MEQ: 750 TABLET, FILM COATED, EXTENDED RELEASE ORAL at 12:05

## 2021-09-24 RX ADMIN — FUROSEMIDE 20 MG: 10 INJECTION, SOLUTION INTRAMUSCULAR; INTRAVENOUS at 09:21

## 2021-09-24 RX ADMIN — PANTOPRAZOLE SODIUM 20 MG: 20 TABLET, DELAYED RELEASE ORAL at 09:21

## 2021-09-24 NOTE — PLAN OF CARE
Problem: Adult Inpatient Plan of Care  Goal: Plan of Care Review  Outcome: Progressing  Flowsheets (Taken 9/24/2021 6432)  Progress: improving  Plan of Care Reviewed With: patient  Outcome Summary: patient planned to be dcd home after 4 pm   Plan of Care Review  Plan of Care Reviewed With: patient  Progress: improving  Outcome Summary: patient planned to be dcd home after 4 pm

## 2021-09-24 NOTE — PROGRESS NOTES
Internal Medicine  Daily Progress Note       SUBJECTIVE   This is a 61 y.o. year-old male admitted on 9/21/2021 with Elevated troponin [R77.8]  History of pulmonary embolus (PE) [Z86.711]  Anemia, unspecified type [D64.9]  Systolic congestive heart failure, unspecified HF chronicity (CMS/HCC) [I50.20]  Congestive heart failure, unspecified HF chronicity, unspecified heart failure type (CMS/HCC) [I50.9].    Interval History: No acute events overnight. Pt this morning reports improvements to his breathing, endorses continued cough but improved since admission. Denies any chest pain, N/V, lightheadedness or dizziness. No new LE edema.      OBJECTIVE   Vital signs in last 24 hours:  Temp:  [36.4 °C (97.6 °F)-37 °C (98.6 °F)] 36.7 °C (98 °F)  Heart Rate:  [67-79] 76  Resp:  [15-18] 18  BP: (130-154)/(83-95) 154/90  SpO2:  [94 %-100 %] 99 %  Oxygen Therapy: None (Room air)    Weight & I/Os:  Weights (last 5 days)     Date/Time Weight    09/24/21 0540 92.8 kg (204 lb 9.6 oz)    09/23/21 0547 93.5 kg (206 lb 1.6 oz)    09/21/21 1841 104 kg (230 lb)          Intake/Output Summary (Last 24 hours) at 9/24/2021 0659  Last data filed at 9/23/2021 1628  24 Hour Net Input/Output from 7AM Yesterday   Intake 1787.24 ml   Output 1600 ml   Net 187.24 ml        PHYSICAL EXAMINATION   Physical Exam  Vitals reviewed.   Constitutional:       General: He is not in acute distress.  HENT:      Mouth/Throat:      Pharynx: Oropharynx is clear.   Eyes:      Conjunctiva/sclera: Conjunctivae normal.   Cardiovascular:      Rate and Rhythm: Normal rate and regular rhythm.      Pulses: Normal pulses.      Heart sounds: Normal heart sounds.   Pulmonary:      Effort: Pulmonary effort is normal.      Breath sounds: Normal breath sounds.   Abdominal:      Palpations: Abdomen is soft.      Tenderness: There is no abdominal tenderness.   Musculoskeletal:      Right lower leg: No edema.      Left lower leg: No edema.   Skin:     General: Skin is warm  and dry.   Neurological:      General: No focal deficit present.      Mental Status: He is alert.          LINES, CATHETERS, DRAINS, AIRWAYS, & WOUNDS   Lines, drains, airways, & wounds:  Peripheral IV (Adult) 09/21/21 Left Wrist (Active)   Number of days: 3       Peripheral IV (Adult) 09/23/21 Anterior; Right Forearm (Active)   Number of days: 1        LABS, IMAGING, & TELE   Labs:  I have reviewed the patient's labs to the time of note. No new clinical concern.    Results from last 7 days   Lab Units 09/24/21  0853 09/23/21  1407 09/23/21  0442   WBC K/uL 6.79 7.40 7.11   HEMOGLOBIN g/dL 10.6* 10.3* 10.1*   HEMATOCRIT % 37.6* 36.8* 36.6*   PLATELETS K/uL 258 272 257       Results from last 7 days   Lab Units 09/23/21  0442 09/22/21  2121 09/22/21  0804 09/21/21  1847 09/21/21  1847   SODIUM mEQ/L 143 144 141   < > 139   POTASSIUM mEQ/L 3.7 3.8 3.6   < > 4.0   CHLORIDE mEQ/L 108 109 110*   < > 109   CO2 mEQ/L 23 23 20*   < > 20*   BUN mg/dL 10 13 14   < > 15   CREATININE mg/dL 1.1 1.2 1.2   < > 1.2   CALCIUM mg/dL 9.4 9.7 8.9   < > 9.2   ALBUMIN g/dL  --   --   --   --  3.2*   BILIRUBIN TOTAL mg/dL  --   --   --   --  0.5   ALK PHOS IU/L  --   --   --   --  78   ALT IU/L  --   --   --   --  32   AST IU/L  --   --   --   --  28   GLUCOSE mg/dL 77 113* 82   < > 140*    < > = values in this interval not displayed.     Imaging personally reviewed (does not include unread studies):  X-RAY CHEST 1 VIEW    Result Date: 9/21/2021  IMPRESSION: No active disease in the chest. COMMENT: The current portable study the chest was compared to that dated 5/24/2006 The lungs are well aerated and clear.  The cardiomediastinal silhouette is normal in size and configuration.  The costophrenic sulci and bony thorax are intact.    CT CHEST PULMONARY EMBOLISM WITH IV CONTRAST    Result Date: 9/22/2021  IMPRESSION: 1. No evidence of pulmonary emboli, as questioned clinically. 2. Low lung volumes. Hypoventilatory changes with suspected air  trapping. Please see above discussion. 3. Small bilateral pleural effusions, right greater than left. 4. Cardiomegaly. Mild coronary artery calcifications. Preliminary results issued by Dr. Garnett at 2309 hours on 9/21/2021.    Telemetry/EKG:  I have independently reviewed the telemetry. No events for the last 24 hours.     ASSESSMENT & PLAN   Shortness of breath  Assessment & Plan  Presented with SOB and nonproductive cough  for 1 week.   Recently admitted 9/13 at [place] for acute PE and anemia. IVC filter placed 9/16. Pt eloped before receiving any medications, not currently on anticoagulation at home.   CT PE at [place] notable for acute PE in left upper lobe branch and DVT studies notable for right posterior tibial, peroneal, popliteal and dstal thigh femoral vein. CT PE on admission (9/22) showed small B/L pleural effusions (R>L), no evidence of PE or RV strain.  TTE at [place] notable for EF 25-30%  SOB likely to be secondary to CHF exacerbation.   BNP 1476   Trop 0.11 x3, downtrending since prior admission    - Pt transitioned off heparin gtt, started rivaroxaban 15mg PO BID   - Cardiology consulted, with thanks  - Continue diuresis with furosemide 20mg qD     * CHF (congestive heart failure) (CMS/[facility])  Assessment & Plan  TTE on prior admission at [place] - EF 25-30%. Seen by cardiology that admission who recommended adding Toprol XL to regimen with low dose diuretic. Patient left the hospital before he could be started on meds.   Patient now presenting with SOB at rest and with exertion and orthopnea, does not appear significantly volume overloaded on exam.   BNP 1476    - Continue diuresis  - Continue metoprolol 12.5 qD, entresto 24-26mg BID, and spironolactone 25mg qD  - Monitor BP, consider incr Entresto or change metoprolol to carvedilol for improved BP control  - Cardiology consulted, appreciate input  - Outpatient follow up with cardiology        Anemia  Assessment & Plan  On admission to [place] noted to have  iron deficiency with reports of tarry stools and endorsed daily NSAID use. Hgb 6.4 requiring 2U PRBC while admitted. GI consulted and pt was scheduled for EGD/[place] on 9/17 at [place] however eloped prior to procedure.   CT A/P at [place] notable for peculiar appearance of fluid in perihepatic space. Consider ascites vs. Small peritoneal hemorrhage although typical hemorrhage would demonstrate attenuation that is higher than that of fluid at the time of imaging  Hemoglobin currently stable at 8.9. No signs of active bleeding  Pt reports prior colonoscopy in Nov 2020 before prostatectomy as well as age-appropriate cancer screening at 49    - Continue IV iron, prior iron studies at [place] consistent with EVAN  - Trend CBC daily  - Transfuse Hgb >7  - If patient has acute drops in Hgb requiring transfusion, consider GI consult for EGD/[place] as inpatient. EGD/[place] as outpatient if CBC remains stable          History of pulmonary embolism  Assessment & Plan  Admitted at [place] on 9/13 for acute PE/DVT  CT PE notable for acute PE in let upper lobe branch and DVT studies notable for right posterior tibial, peroneal, popliteal and dstal thigh femoral vein.  CT PE in [facility] ED shows no signs of emobli or RV strain  Pt was suppose to be discharged on apixaban however eloped before having any meds filled.   IVC filter placed 9/16    - Transitioned from heparin gtt to rivaroxaban  - Will need outpatient pulmonary follow-up      Type 2 diabetes mellitus, with long-term current use of insulin (CMS/[facility])  Assessment & Plan  A1C 7.4 on 9/15/21  Patient reports home regimen as 20-30 units glargine and metformin 1000mg daily. Intermittently takes insulin as needed.     - Hold glargine as AM BG 80s after dose on admission, continue to monitor  - Continue SSI and POCT glucose  - Diabetic diet         VTE Assessment: Pad[place]    VTE Prophylaxis: Current anticoagulants:  rivaroxaban (XARELTO) tablet 15 mg, oral, BID with meals   In followed-by linked group  with  [START ON 10/14/2021] rivaroxaban (XARELTO) tablet 20 mg, oral, Daily with dinner      Code Status: Full Code  Estimated discharge date: 9/25/2021     Baron Aparicio MD  PGY1 IM  [id number]    ATTENDING DOCUMENTATION  ALSO SEE ATTENDING ATTESTATION SECTION OF NOTE

## 2021-09-24 NOTE — PATIENT CARE CONFERENCE
Care Progression Rounds Note  Date: 9/24/2021  Time: 11:51 AM     Patient Name: Enrike Napoles     Medical Record Number: [id number]   YOB: 1960  Sex: Male      Room/Bed: 0352W    Admitting Diagnosis: Elevated troponin [R77.8]  History of pulmonary embolus (PE) [Z86.711]  Anemia, unspecified type [D64.9]  Systolic congestive heart failure, unspecified HF chronicity (CMS/HCC) [I50.20]  Congestive heart failure, unspecified HF chronicity, unspecified heart failure type (CMS/HCC) [I50.9]   Admit Date/Time: 9/21/2021  6:36 PM    Primary Diagnosis: CHF (congestive heart failure) (CMS/HCC)  Principal Problem: CHF (congestive heart failure) (CMS/HCC)    GMLOS: pending  Anticipated Discharge Date: 9/24/2021    AM-PAC:  Mobility Score: 24    Discharge Planning:  Anticipated Discharge Disposition: home without assistance or services    Barriers to Discharge:  Barriers to Discharge: Medical issues not resolved    Participants:  , pharmacy, social work/services, physician, nursing

## 2021-09-24 NOTE — PROGRESS NOTES
Patient: Enrike Napoles  Location: [facility] 0352W  MRN: [id number]  Today's date: 9/24/2021    Attempted to see patient for therapy. Unable due to patient refused.         Pt eating breakfast, will follow up as needed.

## 2021-09-24 NOTE — PROGRESS NOTES
CHF Discharge Counseling- Transitions of Care   Pharmacy Counseling Note      SUMMARY   Met with Enrike Napoles for CHF Discharge counseling.     Medications: The patient states He is unfamiliar  with his medications. PTA med rec was completed by ED pharmacy technician.     -Patient admitted with HFrEF (EF 25-30%) recently admitted at the [facility] where he was left AMA on Entresto 24/26 mg po BID, omeprazole, metformin and insulin Lantus. During this admission, the dose of Entresto is increased to 49/51 mg po BID which was discussed with patient including the indication and side effects of this medication. Patient states he takes his medications at home, but has inconsistent medication refills in the past (last fill in December 2020). He asked a few meaningful questions such as why he needed a water pill and blood thinner medication, in which I help answered. Reviewed all patient’s current medications in detail including the name, dose, frequency, indication and side effects.     -Mentioned to patient to avoid NSAIDs.    Advised patient to look at the AVS to note any new additions, changes, and discontinuations in medications at discharge. Discussed the importance of compliance. The patient states compliance and states he able to manage his medications.     Preferred Rx: [facility] [id number] [address] for bedside delivery of medications   Requesting Refills: all medications     Diet: When asked about salt intake at home, patient states he doesn't consume salt in his food. He was surprised to know about the high sodium content in canned foods, frozen meals and cold deli sandwiches. Recommended home cooked meals with salt-free seasonings such as garlic/onion powder, Mrs Dash seasoning. Education on low-sodium diet provided.     Fluid: Recommended fluid restriction to ~6 cups/day, 1500 mL or ~50 oz. Suggested other alternatives such as ice chips, iced fruits, and sugar-free mint, gum or hard  candy.    Scale: Patient does not weigh himself at home, but was given a weight scale upon discharge. Daily Calendar to track weight was given to patient. Explained the importance of weighing oneself and recording. If patient gains more than 3 lbs in a day or 5 lbs in a week, advised patient to call doctor.     I left patient with my contact information for any further questions.     MEDICATION     Medication List  • atorvastatin  40 mg oral Daily (6p)   • ferric gluconate (FERRLECIT) IVPB  125 mg intravenous Daily   • furosemide  20 mg intravenous Daily   • gabapentin  300 mg oral TID   • insulin aspart U-100  3-5 Units subcutaneous With meals & nightly   • metoprolol succinate XL  12.5 mg oral Daily   • pantoprazole  20 mg oral Daily   • rivaroxaban  15 mg oral BID with meals    Followed by   • [START ON 10/14/2021] rivaroxaban  20 mg oral Daily with dinner   • sacubitriL-valsartan  2 tablet oral BID   • spironolactone  25 mg oral Daily     benzonatate  •  glucose **OR** dextrose **OR** glucagon **OR** dextrose in water  •  Dov Hurtado, PharmD, Transitions of Care Clinical Pharmacist    [place]: [phone]  Office: [phone]  (Time Spent: 30 minutes)  9/24/2021

## 2021-09-24 NOTE — DISCHARGE SUMMARY
Internal Medicine  Inpatient Discharge Summary        BRIEF OVERVIEW   Admitting Provider: Willy De La Fuente MD  Attending Provider: Willy De La Fuente MD Attending phys phone: [phone]    PCP: Unable, To Obtain Pcp None    Admission Date: 9/21/2021  Discharge Date: 9/24/2021     DISCHARGE DIAGNOSES      Primary Discharge Diagnosis  CHF (congestive heart failure) (CMS/[facility])    Secondary Discharge Diagnoses  Active Hospital Problems    Diagnosis Date Noted   • CHF (congestive heart failure) (CMS/[facility]) 09/22/2021     Priority: Medium   • Anemia 09/22/2021     Priority: Low   • History of pulmonary embolism 09/22/2021   • Type 2 diabetes mellitus, with long-term current use of insulin (CMS/[facility]) 05/25/2018      Resolved Hospital Problems    Diagnosis Date Noted Date Resolved   • Shortness of breath 09/22/2021 09/24/2021     Priority: High   • Elevated troponin 09/22/2021 09/24/2021       Active Problem List on Day of Discharge  Patient Active Problem List   Diagnosis   • Chronic pain   • Obesity (BMI 30-39.9)   • Type 2 diabetes mellitus, with long-term current use of insulin (CMS/[facility])   • Smoking   • CHF (congestive heart failure) (CMS/[facility])   • Anemia   • History of pulmonary embolism     SUMMARY OF HOSPITALIZATION      Presenting Problem/History of Present Illness  This is a 61 y.o. year-old male with PMHx recently diagnosed DVT/PE (not compliant with anticoagulation at home), HFrEF, T2DM, HTN, prostate cancer s/p prostatectomy 11/2021 admitted on 9/21/2021 with Elevated troponin [R77.8]  History of pulmonary embolus (PE) [Z86.711]  Anemia, unspecified type [D64.9]  Systolic congestive heart failure, unspecified HF chronicity (CMS/HCC) [I50.20]  Congestive heart failure, unspecified HF chronicity, unspecified heart failure type (CMS/[facility]) [I50.9].    Pt presented with increasing RLE swelling and SOB x1week. Of note, he was recently admitted to [facility] on 9/13 due to RLE swelling, where he was found to have a RLE  DVT and PE. At that time he was also found to have an EF of 25-30%. That admission, he had an IVC filter placed and was started on heparin drip with plans to switch to Eliquis, however he had a drop in hemoglobin. Pt was scheduled for EGD/colonoscopy but he left AMA before this procedure; he had not started anticoagulation or other medications.     [facility], pt had CTA chest showing no new PE. He was started on a heparin drip as well as furosemide IV and admitted for SOB 2/2 likely exacerbation of CHF.     Hospital Course    #CHF  Pt presented with SOB and nonproductive cough  for 1 week with associated worsening of RLE swelling. Per chart review, he was recently admitted 9/13 at [facility] for acute PE and anemia, IVC filter placed 9/16; not on anticoagulation at home.CT PE on [facility] showed small B/L pleural effusions (R>L), no evidence of PE or RV strain; BNP elevated. TTE performed at [facility] was notable for EF 25-30%; pt seen by cardiology that admission, who recommended Toprol XL and low dose diuretic. Pt's SOB was thought to be secondary to CHF exacerbation and he was started on furosemide IV. Pt's breathing and cough improved with continued diuresis, and he was transitioned to PO diuretic. Pt was started on GDMT this hospitalization with cardiology's input.   - Metoprolol succinate 12.5mg qD  - Entresto 49/51 mg BID  - Spironolactone 25mg qD  - Torsemide 40mg qD   - Outpatient follow up with cardiology on 10/7      #Anemia  On admission to [facility] pt noted to have iron deficiency with reports of tarry stools and endorsed daily NSAID use. Hgb 6.4 requiring 2U PRBC while admitted. GI consulted and pt was scheduled for EGD/[place] on 9/17 at [facility] however eloped prior to procedure. This admission, pt had stable Hgb with no signs or symptoms of active bleeding. Pt reports prior colonoscopy in Nov 2020 before his prostatectomy as well as age-appropriate cancer screening at age 49. Pt was started on IV iron; received 3  doses this hospitalization. His Hgb remained stable this hospitalization and he did not require any transfusions. He was discharged on PO iron and advised to follow up with GI.   - PO iron supplement  - Follow up with GI for EGD/Colonoscopy as outpatient      #Hx of PE  Pt admitted at [facility] on 9/13 for acute PE/DVT, IVC filter placed; pt was supposed to be discharged on apixaban but left the hospital before having any medications filled. CT PE at [facility] notable for acute PE in let upper lobe branch and DVT studies notable for right posterior tibial, peroneal, popliteal and dstal thigh femoral vein. CT PE in [facility] ED showed no signs of emobli or RV strain. Pt was started on heparin drip and transitioned to PO rivaroxaban as his Hgb remained stable.   - Continue rivaroxaban 15mg BID for total of 21 days, followed by rivaroxaban 20mg qD     #T2DM  A1C 7.4 on 9/15/21. Patient reported his home regimen as 20-30 units glargine and metformin 1000mg daily, but takes insulin intermittently and has not recently filled his metformin. This hospitalization pt's glargine was held, as his BGs were within normal range, with plan to restart metformin upon discharge.    - Metformin 500mg BID  - Follow up with PCP for continued diabetes management    Exam on Day of Discharge  Physical Exam  Vitals reviewed.   Constitutional:       General: He is not in acute distress.  HENT:      Head: Normocephalic and atraumatic.      Mouth/Throat:      Mouth: Mucous membranes are moist.   Eyes:      Conjunctiva/sclera: Conjunctivae normal.   Cardiovascular:      Rate and Rhythm: Normal rate and regular rhythm.      Pulses: Normal pulses.      Heart sounds: Normal heart sounds.   Pulmonary:      Effort: Pulmonary effort is normal.      Breath sounds: Normal breath sounds.   Abdominal:      Palpations: Abdomen is soft.      Tenderness: There is no abdominal tenderness.   Musculoskeletal:      Right lower leg: No edema.      Left lower leg: No edema.    Skin:     General: Skin is warm and dry.   Neurological:      General: No focal deficit present.      Mental Status: He is alert.         Consults During Admission  IP CONSULT TO CASE MANAGEMENT  IP CONSULT TO NUTRITION SERVICES  IP CONSULT TO CARDIOLOGY    DISCHARGE MEDICATIONS   New, changed, or stopped medications from this admission:       Medication List      START taking these medications    atorvastatin 40 mg tablet  Commonly known as: LIPITOR  Take 1 tablet (40 mg total) by mouth daily.  Dose: 40 mg     ENTRESTO 49-51 mg per tablet  Take 1 tablet by mouth 2 (two) times a day.  Dose: 1 tablet  Generic drug: sacubitriL-valsartan     ferrous sulfate 325 mg (65 mg iron) tablet  Commonly known as: FerrouSuL  Take 1 tablet (325 mg total) by mouth every other day.  Dose: 325 mg     guaiFENesin 100 mg/5 mL syrup  Commonly known as: ROBITUSSIN  Take 10 mL (200 mg total) by mouth every 4 (four) hours as needed for cough for up to 10 days.  Dose: 200 mg     magnesium oxide 400 mg (241.3 mg magnesium) tablet  Commonly known as: MAG-OX  Take 1 tablet (400 mg total) by mouth daily.  Dose: 400 mg     metFORMIN 500 mg tablet  Commonly known as: GLUCOPHAGE  Take 1 tablet (500 mg total) by mouth 2 (two) times a day with meals.  Dose: 500 mg     metoprolol succinate XL 25 mg 24 hr tablet  Commonly known as: TOPROL-XL  Start taking on: September 25, 2021  Take 0.5 tablets (12.5 mg total) by mouth daily.  Dose: 12.5 mg     pantoprazole 20 mg EC tablet  Commonly known as: PROTONIX  Start taking on: September 25, 2021  Take 1 tablet (20 mg total) by mouth daily Indications: gastroesophageal reflux disease.  Dose: 20 mg  Replaces: omeprazole 20 mg capsule     potassium chloride 10 mEq CR tablet  Commonly known as: KLOR-CON  Take 2 tablets (20 mEq total) by mouth daily.  Dose: 20 mEq     * rivaroxaban 15 mg tablet  Commonly known as: XARELTO  Take 1 tablet (15 mg total) by mouth 2 (two) times a day with meals for 40 doses  Indications: blood clot in a deep vein of the extremities, a clot in the lung.  Dose: 15 mg     * rivaroxaban 20 mg tablet  Commonly known as: XARELTO  Start taking on: October 13, 2021  Take 1 tablet (20 mg total) by mouth daily with dinner.  Dose: 20 mg     spironolactone 25 mg tablet  Commonly known as: ALDACTONE  Start taking on: September 25, 2021  Take 1 tablet (25 mg total) by mouth daily.  Dose: 25 mg     torsemide 20 mg tablet  Commonly known as: DEMADEX  Take 2 tablets (40 mg total) by mouth daily.  Dose: 40 mg         * This list has 2 medication(s) that are the same as other medications prescribed for you. Read the directions carefully, and ask your doctor or other care provider to review them with you.            CHANGE how you take these medications    gabapentin 300 mg capsule  Commonly known as: NEURONTIN  Take 1 capsule (300 mg total) by mouth 3 (three) times a day.  Dose: 300 mg  What changed: when to take this        STOP taking these medications    omeprazole 20 mg capsule  Commonly known as: PriLOSEC  Replaced by: pantoprazole 20 mg EC tablet            Non-Medication orders at discharge:   None        PROCEDURES / LABS / IMAGING      Operative Procedures  None    Other Procedures  none    Pertinent Labs    Results from last 7 days   Lab Units 09/24/21  0853 09/23/21  1407 09/23/21  0442   WBC K/uL 6.79 7.40 7.11   HEMOGLOBIN g/dL 10.6* 10.3* 10.1*   HEMATOCRIT % 37.6* 36.8* 36.6*   PLATELETS K/uL 258 272 257     Results from last 7 days   Lab Units 09/24/21  0853 09/23/21  0442 09/22/21  2121 09/22/21  0804 09/21/21  1847   SODIUM mEQ/L 141 143 144   < > 139   POTASSIUM mEQ/L 3.9 3.7 3.8   < > 4.0   CHLORIDE mEQ/L 108 108 109   < > 109   CO2 mEQ/L 23 23 23   < > 20*   BUN mg/dL 13 10 13   < > 15   CREATININE mg/dL 1.2 1.1 1.2   < > 1.2   CALCIUM mg/dL 9.3 9.4 9.7   < > 9.2   ALBUMIN g/dL  --   --   --   --  3.2*   BILIRUBIN TOTAL mg/dL  --   --   --   --  0.5   ALK PHOS IU/L  --   --   --   --   78   ALT IU/L  --   --   --   --  32   AST IU/L  --   --   --   --  28   GLUCOSE mg/dL 87 77 113*   < > 140*    < > = values in this interval not displayed.       Pertinent Imaging  X-RAY CHEST 1 VIEW    Result Date: 9/21/2021  IMPRESSION: No active disease in the chest. COMMENT: The current portable study the chest was compared to that dated 5/24/2006 The lungs are well aerated and clear.  The cardiomediastinal silhouette is normal in size and configuration.  The costophrenic sulci and bony thorax are intact.    CT CHEST PULMONARY EMBOLISM WITH IV CONTRAST    Result Date: 9/22/2021  IMPRESSION: 1. No evidence of pulmonary emboli, as questioned clinically. 2. Low lung volumes. Hypoventilatory changes with suspected air trapping. Please see above discussion. 3. Small bilateral pleural effusions, right greater than left. 4. Cardiomegaly. Mild coronary artery calcifications. Preliminary results issued by Dr. Garnett at 2309 hours on 9/21/2021.      OUTPATIENT  FOLLOW-UP / REFERRALS / PENDING TESTS      Outpatient Follow-Up Appointments  Encounter Information    This patient does not currently have any appointments scheduled.         Referrals  No orders of the defined types were placed in this encounter.      Test Results Pending at Discharge  None    Important Issues to Address in Follow-Up  - Follow up with cardiology for management of CHF  - Follow up with GI for likely EGD/colonoscopy, evaluation of reported melena and anemia  - Follow up with PCP  DISCHARGE DISPOSITION      Disposition: Home     Code Status At Discharge: Full Code    Physician Order for Life-Sustaining Treatment Document Status      No documents found                 ATTENDING DOCUMENTATION  ALSO SEE ATTENDING ATTESTATION SECTION OF NOTE     I performed a history and physical examination of the patient and discussed the management with the Resident. I reviewed the Resident's note and agree with the documented findings and plan of care, except for  my comments below or within the additional notes today.    Agree with discharge summary as detailed above, patient now stable for d/c after admission for decompensated systolic congestive heart failure.  Now optimized on GDMT and euvolemic.  Also restarted on DOAC given recent DVT/PE.  Has close f/u scheduled with [facility].  Reviewed medication plan and follow up plan closely with patient prior to discharge.  Total time spent in d/c planning and care coordination 35 minutes.    Willy De La Fuente MD

## 2021-09-24 NOTE — PLAN OF CARE
Problem: Adult Inpatient Plan of Care  Goal: Plan of Care Review  Outcome: Progressing  Flowsheets (Taken 9/24/2021 1314)  Progress: improving  Plan of Care Reviewed With: patient  Outcome Summary: Per info in medical rounds today, tentative d/c today pending 2 pm labs. Dispo home without skill needs. Pt is declining HC. CM s/w pt at the bedside, who is aware and in agreement with current DCP and confirmed will have ride at d/c. Will continue to follow until d/c completed.

## 2021-09-24 NOTE — PROGRESS NOTES
Cardiology Inpatient  Progress Note  [facility] [place]     [facility]  [address]  [address] [place] [address]     TEL  [phone]  [email or web address]     SUBJECTIVE   Enrike Napoles is a 61 y.o. year-old male admitted on 9/21/2021 with Elevated troponin [R77.8]  History of pulmonary embolus (PE) [Z86.711]  Anemia, unspecified type [D64.9]  Systolic congestive heart failure, unspecified HF chronicity (CMS/HCC) [I50.20]  Congestive heart failure, unspecified HF chronicity, unspecified heart failure type (CMS/HCC) [I50.9].    Interval History: Patient has no complaints this morning in terms of chest pain shortness of breath or palpitations.  He denies having any bleeding with bowel movements or dysuria.  He wants to go home.      ROS  Constitution: Negative for chills and fever.   Eyes: Negative for acute visual change  Cardiovascular: Negative for chest pain, palpitations and syncope.   Respiratory: Negative for shortness of breath   Hematologic/Lymphatic: Negative for bleeding   Skin: Negative for rash. No new skin changes  Gastrointestinal: Negative for abdominal pain, nausea and vomiting.   Genitourinary: Negative for dysuria   Neurological: Negative for headaches.      OBJECTIVE      Intake/Output    Intake/Output Summary (Last 24 hours) at 9/24/2021 1220  Last data filed at 9/24/2021 1200  Gross per 24 hour   Intake 1507.24 ml   Output 1500 ml   Net 7.24 ml       Weights (last 7 days)     Date/Time Weight    09/24/21 0540 92.8 kg (204 lb 9.6 oz)    09/23/21 0547 93.5 kg (206 lb 1.6 oz)    09/21/21 1841 104 kg (230 lb)                Scheduled Meds:  • atorvastatin  40 mg oral Daily (6p)   • ferric gluconate (FERRLECIT) IVPB  125 mg intravenous Daily   • furosemide  20 mg intravenous Daily   • gabapentin  300 mg oral TID   • insulin aspart U-100  3-5 Units subcutaneous With meals & nightly   • metoprolol succinate XL  12.5  mg oral Daily   • pantoprazole  20 mg oral Daily   • rivaroxaban  15 mg oral BID with meals    Followed by   • [START ON 10/14/2021] rivaroxaban  20 mg oral Daily with dinner   • sacubitriL-valsartan  2 tablet oral BID   • spironolactone  25 mg oral Daily         Labs    CBC Results       09/24/21 09/23/21 09/23/21     0853 1407 0442    WBC 6.79 7.40 7.11    RBC 4.94 4.87 4.69    HGB 10.6 10.3 10.1    HCT 37.6 36.8 36.6    MCV 76.1 75.6 78.0    MCH 21.5 21.1 21.5    MCHC 28.2 28.0 27.6     272 257          CMP Results       09/24/21 09/23/21 09/22/21     0853 0442 2121     143 144    K 3.9 3.7 3.8    Cl 108 108 109    CO2 23 23 23    Glucose 87 77 113    BUN 13 10 13    Creatinine 1.2 1.1 1.2    Calcium 9.3 9.4 9.7    Anion Gap 10 12 12    EGFR >60.0 >60.0 >60.0        @BNP@  PT PTT Results       09/24/21 09/23/21 09/23/21     0853 0442 0006    PTT 45 84 94         Comment for PTT at 0853 on 09/24/21: The Standard Therapeutic Range for Heparin is 68 to 101 seconds.    Comment for PTT at 0442 on 09/23/21: The Standard Therapeutic Range for Heparin is 68 to 101 seconds.    Comment for PTT at 0006 on 09/23/21: The Standard Therapeutic Range for Heparin is 68 to 101 seconds.          Troponin I Results       09/22/21 09/22/21 09/21/21     0610 0252 1848    Troponin I 0.11 0.11 0.11         Comment for Troponin I at 0610 on 09/22/21: Result requires test to be repeated on new specimen 4-6 hours after the original.    Comment for Troponin I at 0252 on 09/22/21: Result requires test to be repeated on new specimen 4-6 hours after the original.    Comment for Troponin I at 1848 on 09/21/21: Result requires test to be repeated on new specimen 4-6 hours after the original.        [unfilled]        PHYSICAL EXAMINATION      Vital signs in last 24 hours:  Temp:  [36.4 °C (97.6 °F)-37 °C (98.6 °F)] 36.7 °C (98.1 °F)  Heart Rate:  [67-84] 84  Resp:  [15-18] 18  BP: (130-154)/(82-95) 130/82  O2 saturation 99% room  air    Physical Exam  Constitutional: Comfortable. Well-developed and well-nourished.   HEENT:  Neck Supple.  No JVD   Head: Normocephalic.   Eyes: Extraocular movements intact  Cardiovascular: Normal rate, regular rhythm 1 out of 6 stock murmur right sternal border no S3 gallop exam reveals no friction rub.    Pulmonary/Chest: Effort normal decreased breath sounds bilaterally. No wheezes.  Abdominal: Soft and nontender.   Neurological: Alert and oriented to person, place, and time.   Extremities: No edema  Skin: Skin is warm and dry.   Psychiatric: Behavior is normal.      IMAGING / TELE/ MEDS      EKG/Telemetry  Normal sinus rhythm   Possible Left atrial enlargement   Inferior infarct , age undetermined   Poor R wave progression Cannot exclude Anterior infarct   Abnormal ECG   When compared with ECG of 24-MAY-2006 19:16,   Significant changes have occurred   Confirmed by OBDULIO CADENA ([id number]) on 9/22/2021 10:21:13 PM    Tele-sinus rhythm 70s no significant ectopy  ASSESSMENT AND PLAN        No new Assessment & Plan notes have been filed under this hospital service since the last note was generated.  Service: Cardiology  61-year-old man with recent work-up and admission at the [facility] 9-12-21 notable for cardiomyopathy, pulmonary embolus, and anemia  Admitted here with shortness of breath and congestive heart failure.     Decompensated congestive heart failure with ejection fraction 25 to 30%  -Patient has been diuresed with IV diuretic and appears euvolemic.  As outpatient would maintain Entresto, furosemide 20 mg p.o. daily, metoprolol succinate 12.5 mg daily Spironolactone 25 mg daily.  -Mild troponin leak secondary to congestive heart failure/demand ischemia.  Current presentation is not suggestive of an acute coronary syndrome and patient may have further ischemic evaluation as outpatient if needed-has upcoming cardiology appointment  -Tobacco cessation recommended (he has been smoking for  approximately 30 years and says he stopped a few days ago)  -Coronary calcifications noted on CTA.  Agree with atorvastatin 40 mg daily     2.  Right lower extremity DVT, pulmonary embolus left upper lobe by CTA at [facility] 9-12-21, status post IVC filter  -Started on Xarelto and hemoglobin stable-management per primary team.      3.  Hypertension  -Continue with current medications.  May consider evaluation for sleep apnea as outpatient     Has cardiology appointment scheduled 10-7-21 with Dr. Kim at the [facility].  Patient may have repeat blood work before discharge or prior to his follow-up appointment with cardiology 10-7-21.  Patient wishes to go and importance of follow-up was emphasized as well as medical compliance.         Recommendations discussed with Dr. Aparicio.  We will sign off.  If new questions arise please reconsult.       Arvin Reyna MD [facility]  9/24/2021  12:20 PM    This document was generated utilizing voice recognition technology. A reasonable attempt at proofreading has been made to minimize errors but please excuse any typographical errors which may be present. Please call with any questions.

## 2021-09-24 NOTE — STUDENT
Medical Student Note: For educational purposes only.  Do not use for coding or billing.     Medical Student Daily Progress Note    Subjective     Interval History: none.       Objective     Vital signs in last 24 hours:  Temp:  [36.2 °C (97.2 °F)-37 °C (98.6 °F)] 37 °C (98.6 °F)  Heart Rate:  [67-79] 73  Resp:  [15-18] 15  BP: (130-147)/(83-95) 132/83      Intake/Output Summary (Last 24 hours) at 9/24/2021 0726  Last data filed at 9/23/2021 1628  Gross per 24 hour   Intake 1787.24 ml   Output 1600 ml   Net 187.24 ml     Intake/Output this shift:  No intake/output data recorded.    Physical Exam:  General appearance: alert, appears stated age and cooperative  Lungs: clear to auscultation bilaterally  Heart: regular rate and rhythm, S1, S2 normal, no murmur, click, rub or gallop  Abdomen: soft, non-tender; bowel sounds normal; no masses, no organomegaly  Extremities: edema R LE 2+ edema calf tender to palpation, L LE 1+ edema to midcalf    Labs  Labs are pending.    Imaging  Not applicable    VTE pxx: Xarelto 15 mg bid  Diet: cardiac diet    Assessment/Plan      #1 SOB and cough, Hx of PE  1 week duration, since discharge from [place]. Possible etiologies include PE vs. CHF exacerbation vs. ACS vs. Severe anemia. PE r/o from CT chest showing only small bilateral pleural effusions, no RV strain. CHF exacerbation possible due to some volume overload per exam and low EF (25-30% 9/2021). ACS still possible pending ischemic workup. Severe anemia less likely give no active bleeding source and HgB stable at 8.5. Pt stable ORA.  - Tessalon 200 mg and robitussin PRN for symptoms, pt reports improvement  - O/p Pulm f/u for PE hx     #2 CHF exacerbation  EF 25-30% per 9/2021 ECHO.  - Further cards recs appreciated   - c/w xarelto for AC  - GDMT, c/w metoprolol 12.5 mg, entresto 24-26 mg, spironolactone 25 mg, lipitor 40 mg  - c/w Lasix 20 mg  - Trop peaked at .06, no further trending needed  - o/p f/u cards     #3 Anemia  Hx of  dark stools and reported daily NSAID use at [place] s/p 2 units pRBCs eloped prior to EGD/colonoscopy. HgB stable at 10.1 (from 8.5).  - c/w IV iron qd  - trend CBC  - if HgB < 7, transfuse and consult GI for IP EGD/colonoscopy  - otherwise o/p EGD/colonoscopy     #4 DM II  A1C 7.4 9/2021 prescribed home 20-30 nightly glargine and metformin 1000 mg.  - no current insulin reqs  - poc glc checks  - diabetic diet      Expected Discharge Date:   9/25/2021

## 2021-09-24 NOTE — DISCHARGE INSTRUCTIONS
Dear Mr. Napoles,    You were admitted to [facility] with symptoms of shortness of breath, cough and right leg soreness. You have a recent history of a blood clot in your lung from your care at [facility] so we conducted imaging and were able to rule out another clot. We found from the imaging that your shortness of breath was due to an overload of fluid in your lungs, and the leg soreness was due to a blood clot with swelling also due to fluid overload.     We transitioned you from an IV medication to prevent blood clotting to an oral medication called Xarelto. We did this after we ruled out any bleeding events by analyzing your bloodwork. For the fluid overload, we gave you a diuretics medication (or water pill) called Lasix which increased your urination and improved your shortness of breath. For the cough we gave you medications as needed to improve the symptoms. A regimen of medications for your long-term heart health was started including metoprolol, Entresto, spironolactone, and lipitor.    Your shortness of breath and leg swelling improved as your overall fluid level decreased. It will be important to continue taking your medications at home, limit your fluids that you drink, and avoid salty foods to keep these symptoms from coming back.     Please call your doctor or present to the ED if you experience any of the following symptoms:  - episodes of severe shortness of breath where you feel lightheaded and cannot breathe  - large bloody bowel movements    It is important that you schedule or keep these appointments for follow up:  - MARITZA Garcia, of [facility] at [address] [place] [address] on 10/1/21 at 10 AM.  This will be your primary care appointment.  It is very important to keep this appointment and bring all of your discharge paperwork and medication list to the appointment.  - Keep your appointment with Dr. Kraig Kim of [facility] (heart doctor) at [facility].  The address  is [address] [place] [address] on the 3rd floor.  Your appointment is on 10/7/21 at 8:40 AM  - Please follow up with a gastroenterologist for a colonoscopy and upper endoscopy to rule out bleeding in the GI tract. If you would like to follow up with a GI doctor here, please call [phone] to make an appointment.  - Please follow up with a pulmonologist (lung doctor) to assess your lung function.  If you would like to see a lung doctor here, please call [phone] to make an appointment.    Thank you for choosing [facility]!        Heart Failure, Self Care  Heart failure is a serious condition. This sheet explains things you need to do to take care of yourself at home. To help you stay as healthy as possible, you may be asked to change your diet, take certain medicines, and make other changes in your life. Your doctor may also give you more specific instructions. If you have problems or questions, call your doctor.  What are the risks?  Having heart failure makes it more likely for you to have some problems. These problems can get worse if you do not take good care of yourself. Problems may include:  · Blood clotting problems. This may cause a stroke.  · Damage to the kidneys, liver, or lungs.  · Abnormal heart rhythms.  Supplies needed:  · Scale for weighing yourself.  · Blood pressure monitor.  · Notebook.  · Medicines.  How to care for yourself when you have heart failure  Medicines  Take over-the-counter and prescription medicines only as told by your doctor. Take your medicines every day.  · Do not stop taking your medicine unless your doctor tells you to do so.  · Do not skip any medicines.  · Get your prescriptions refilled before you run out of medicine. This is important.  Eating and drinking    · Eat heart-healthy foods. Talk with a diet specialist (dietitian) to create an eating plan.  · Choose foods that:  ? Have no trans fat.  ? Are low in saturated fat and cholesterol.  · Choose healthy foods, such  as:  ? Fresh or frozen fruits and vegetables.  ? Fish.  ? Low-fat (lean) meats.  ? Legumes, such as beans, peas, and lentils.  ? Fat-free or low-fat dairy products.  ? Whole-grain foods.  ? High-fiber foods.  · Limit salt (sodium) if told by your doctor. Ask your diet specialist to tell you which seasonings are healthy for your heart.  · Cook in healthy ways instead of frying. Healthy ways of cooking include roasting, grilling, broiling, baking, poaching, steaming, and stir-frying.  · Limit how much fluid you drink, if told by your doctor.  Alcohol use  · Do not drink alcohol if:  ? Your doctor tells you not to drink.  ? Your heart was damaged by alcohol, or you have very bad heart failure.  ? You are pregnant, may be pregnant, or are planning to become pregnant.  · If you drink alcohol:  ? Limit how much you use to:  § 0-1 drink a day for women.  § 0-2 drinks a day for men.  ? Be aware of how much alcohol is in your drink. In the [place] one drink equals one 12 oz bottle of beer (355 mL), one 5 oz glass of wine (148 mL), or one 1½ oz glass of hard liquor (44 mL).  Lifestyle    · Do not use any products that contain nicotine or tobacco, such as cigarettes, e-cigarettes, and chewing tobacco. If you need help quitting, ask your doctor.  ? Do not use nicotine gum or patches before talking to your doctor.  · Do not use illegal drugs.  · Lose weight if told by your doctor.  · Do physical activity if told by your doctor. Talk to your doctor before you begin an exercise if:  ? You are an older adult.  ? You have very bad heart failure.  · Learn to manage stress. If you need help, ask your doctor.  · Get rehab (rehabilitation) to help you stay independent and to help with your quality of life.  · Plan time to rest when you get tired.  Check weight and blood pressure    · Weigh yourself every day. This will help you to know if fluid is building up in your body.  ? Weigh yourself every morning after you pee (urinate) and before  you eat breakfast.  ? Wear the same amount of clothing each time.  ? Write down your daily weight. Give your record to your doctor.  · Check and write down your blood pressure as told by your doctor.  · Check your pulse as told by your doctor.  Dealing with very hot and very cold weather  · If it is very hot:  ? Avoid activities that take a lot of energy.  ? Use air conditioning or fans, or find a cooler place.  ? Avoid caffeine and alcohol.  ? Wear clothing that is loose-fitting, lightweight, and light-colored.  · If it is very cold:  ? Avoid activities that take a lot of energy.  ? Layer your clothes.  ? Wear mittens or gloves, a hat, and a scarf when you go outside.  ? Avoid alcohol.  Follow these instructions at home:  · Stay up to date with shots (vaccines). Get pneumococcal and flu (influenza) shots.  · Keep all follow-up visits as told by your doctor. This is important.  Contact a doctor if:  · You gain weight quickly.  · You have increasing shortness of breath.  · You cannot do your normal activities.  · You get tired easily.  · You cough a lot.  · You don't feel like eating or feel like you may vomit (nauseous).  · You become puffy (swell) in your hands, feet, ankles, or belly (abdomen).  · You cannot sleep well because it is hard to breathe.  · You feel like your heart is beating fast (palpitations).  · You get dizzy when you stand up.  Get help right away if:  · You have trouble breathing.  · You or someone else notices a change in your behavior, such as having trouble staying awake.  · You have chest pain or discomfort.  · You pass out (faint).  These symptoms may be an emergency. Do not wait to see if the symptoms will go away. Get medical help right away. Call your local emergency services (911 in the [place] Do not drive yourself to the hospital.  Summary  · Heart failure is a serious condition. To care for yourself, you may have to change your diet, take medicines, and make other lifestyle  changes.  · Take your medicines every day. Do not stop taking them unless your doctor tells you to do so.  · Eat heart-healthy foods, such as fresh or frozen fruits and vegetables, fish, lean meats, legumes, fat-free or low-fat dairy products, and whole-grain or high-fiber foods.  · Ask your doctor if you can drink alcohol. You may have to stop alcohol use if you have very bad heart failure.  · Contact your doctor if you gain weight quickly or feel that your heart is beating too fast. Get help right away if you pass out, or have chest pain or trouble breathing.  This information is not intended to replace advice given to you by your health care provider. Make sure you discuss any questions you have with your health care provider.  Document Revised: 03/31/2020 Document Reviewed: 04/01/2020  [employer] Patient Education © 2021 [employer]

## 2021-10-05 ENCOUNTER — TELEPHONE (OUTPATIENT)
Dept: SOCIAL WORK | Facility: HOSPITAL | Age: 61
End: 2021-10-05

## 2021-10-05 NOTE — TELEPHONE ENCOUNTER
CC attempted CHF follow up. Someone answered home number and stated it was a wrong number. CC then tried pt cell, left message.

## 2021-10-07 ENCOUNTER — TELEPHONE (OUTPATIENT)
Dept: SOCIAL WORK | Facility: HOSPITAL | Age: 61
End: 2021-10-07

## 2024-03-26 ENCOUNTER — TRANSCRIBE ORDERS (OUTPATIENT)
Dept: RADIOLOGY | Facility: HOSPITAL | Age: 64
End: 2024-03-26

## 2024-03-26 DIAGNOSIS — F17.210 NICOTINE DEPENDENCE, CIGARETTES, UNCOMPLICATED: Primary | ICD-10-CM
